# Patient Record
Sex: FEMALE | Race: WHITE | NOT HISPANIC OR LATINO | Employment: UNEMPLOYED | URBAN - METROPOLITAN AREA
[De-identification: names, ages, dates, MRNs, and addresses within clinical notes are randomized per-mention and may not be internally consistent; named-entity substitution may affect disease eponyms.]

---

## 2017-04-26 ENCOUNTER — TRANSCRIBE ORDERS (OUTPATIENT)
Dept: CARDIOLOGY | Facility: CLINIC | Age: 57
End: 2017-04-26

## 2017-04-26 ENCOUNTER — LAB (OUTPATIENT)
Dept: LAB | Facility: HOSPITAL | Age: 57
End: 2017-04-26
Attending: INTERNAL MEDICINE

## 2017-04-26 ENCOUNTER — OFFICE VISIT (OUTPATIENT)
Dept: CARDIOLOGY | Facility: CLINIC | Age: 57
End: 2017-04-26

## 2017-04-26 VITALS
DIASTOLIC BLOOD PRESSURE: 68 MMHG | WEIGHT: 110 LBS | HEART RATE: 84 BPM | SYSTOLIC BLOOD PRESSURE: 132 MMHG | BODY MASS INDEX: 17.27 KG/M2 | HEIGHT: 67 IN

## 2017-04-26 DIAGNOSIS — I73.9 PAD (PERIPHERAL ARTERY DISEASE) (HCC): Primary | ICD-10-CM

## 2017-04-26 DIAGNOSIS — I73.9 PERIPHERAL VASCULAR DISEASE, UNSPECIFIED (HCC): ICD-10-CM

## 2017-04-26 DIAGNOSIS — Z01.810 PRE-OPERATIVE CARDIOVASCULAR EXAMINATION: Primary | ICD-10-CM

## 2017-04-26 DIAGNOSIS — I73.9 CLAUDICATION (HCC): ICD-10-CM

## 2017-04-26 DIAGNOSIS — Z13.6 SCREENING FOR ISCHEMIC HEART DISEASE: ICD-10-CM

## 2017-04-26 DIAGNOSIS — Z01.810 PRE-OPERATIVE CARDIOVASCULAR EXAMINATION: ICD-10-CM

## 2017-04-26 LAB
ANION GAP SERPL CALCULATED.3IONS-SCNC: 17.7 MMOL/L
BASOPHILS # BLD AUTO: 0.03 10*3/MM3 (ref 0–0.2)
BASOPHILS NFR BLD AUTO: 0.3 % (ref 0–1.5)
BUN BLD-MCNC: 13 MG/DL (ref 6–20)
BUN/CREAT SERPL: 14.4 (ref 7–25)
CALCIUM SPEC-SCNC: 10.4 MG/DL (ref 8.6–10.5)
CHLORIDE SERPL-SCNC: 100 MMOL/L (ref 98–107)
CO2 SERPL-SCNC: 23.3 MMOL/L (ref 22–29)
CREAT BLD-MCNC: 0.9 MG/DL (ref 0.57–1)
DEPRECATED RDW RBC AUTO: 46.9 FL (ref 37–54)
EOSINOPHIL # BLD AUTO: 0.09 10*3/MM3 (ref 0–0.7)
EOSINOPHIL NFR BLD AUTO: 0.9 % (ref 0.3–6.2)
ERYTHROCYTE [DISTWIDTH] IN BLOOD BY AUTOMATED COUNT: 14.3 % (ref 11.7–13)
GFR SERPL CREATININE-BSD FRML MDRD: 65 ML/MIN/1.73
GLUCOSE BLD-MCNC: 98 MG/DL (ref 65–99)
HCT VFR BLD AUTO: 45.7 % (ref 35.6–45.5)
HGB BLD-MCNC: 15.2 G/DL (ref 11.9–15.5)
IMM GRANULOCYTES # BLD: 0.02 10*3/MM3 (ref 0–0.03)
IMM GRANULOCYTES NFR BLD: 0.2 % (ref 0–0.5)
LYMPHOCYTES # BLD AUTO: 2.27 10*3/MM3 (ref 0.9–4.8)
LYMPHOCYTES NFR BLD AUTO: 23.1 % (ref 19.6–45.3)
MCH RBC QN AUTO: 30 PG (ref 26.9–32)
MCHC RBC AUTO-ENTMCNC: 33.3 G/DL (ref 32.4–36.3)
MCV RBC AUTO: 90.3 FL (ref 80.5–98.2)
MONOCYTES # BLD AUTO: 0.56 10*3/MM3 (ref 0.2–1.2)
MONOCYTES NFR BLD AUTO: 5.7 % (ref 5–12)
NEUTROPHILS # BLD AUTO: 6.86 10*3/MM3 (ref 1.9–8.1)
NEUTROPHILS NFR BLD AUTO: 69.8 % (ref 42.7–76)
PLATELET # BLD AUTO: 465 10*3/MM3 (ref 140–500)
PMV BLD AUTO: 9.7 FL (ref 6–12)
POTASSIUM BLD-SCNC: 5.1 MMOL/L (ref 3.5–5.2)
RBC # BLD AUTO: 5.06 10*6/MM3 (ref 3.9–5.2)
SODIUM BLD-SCNC: 141 MMOL/L (ref 136–145)
WBC NRBC COR # BLD: 9.83 10*3/MM3 (ref 4.5–10.7)

## 2017-04-26 PROCEDURE — 36415 COLL VENOUS BLD VENIPUNCTURE: CPT

## 2017-04-26 PROCEDURE — 85025 COMPLETE CBC W/AUTO DIFF WBC: CPT

## 2017-04-26 PROCEDURE — 99213 OFFICE O/P EST LOW 20 MIN: CPT | Performed by: INTERNAL MEDICINE

## 2017-04-26 PROCEDURE — 80048 BASIC METABOLIC PNL TOTAL CA: CPT

## 2017-04-27 NOTE — PROGRESS NOTES
Subjective:     Encounter Date:04/26/2017      Patient ID: Susy Ferreira is a 57 y.o. female.    Chief Complaint: PAD, claudication    History of Present Illness     Dear Dr. Willoughby,    I had the pleasure of seeing this patient in cardiac follow up today.  As you well know, she is a jessie 57-year-old woman with history of smoking and peripheral arterial disease.  She has a past history of cholesterol embolization.      She has had stenting of her bilateral iliac arteries in the past due to peripheral artery disease.  Most recently, she had an intervention about 5 months ago due to restenosis and occlusion of her left iliac artery.  Balloon angioplasty alone was performed with a good result.    The patient has developed recurrent symptoms in the left leg.  She has pain and has trouble walking.  She went to [facility], because she was in the area at the time and she had a CT scan that demonstrated occlusion at the left iliac stent.  She was seen by Vascular Surgery and was offered aortofemoral bypass, but she declined.  She is now here to seek alternative strategies.        Review of Systems   All other systems reviewed and are negative.      Procedures       Objective:     Physical Exam   Constitutional: She is oriented to person, place, and time. She appears well-developed and well-nourished.   HENT:   Head: Normocephalic and atraumatic.   Neck: Normal range of motion. Neck supple.   Cardiovascular: Normal rate, regular rhythm and normal heart sounds.    Decreased pulse in L femoral   Pulmonary/Chest: Effort normal and breath sounds normal.   Abdominal: Soft. Bowel sounds are normal.   Musculoskeletal: Normal range of motion.   Neurological: She is alert and oriented to person, place, and time.   Skin: Skin is warm and dry.   Psychiatric: She has a normal mood and affect. Her behavior is normal. Thought content normal.   Vitals reviewed.      Lab Review:       Assessment:          Diagnosis Plan    1. PAD (peripheral artery disease)  Case Request Cath Lab: Abdominal Aortagram with Runoff - PTA of occluded L iliac stent   2. Claudication            Plan:        It was a pleasure to see this patient in cardiac follow up today.  She has recurrent occlusion of her left iliac artery stent.  I suspect that this is due to thrombosis and restenosis.  I think she is unlikely to have a good long term result with balloon angioplasty alone.  I will consider restenting this area, as she is not interested in vascular surgery at this time.  Given the appearance on the CT angiogram, I think the common femoral is still preserved and can serve as a future surgical site if necessary.

## 2017-05-02 ENCOUNTER — HOSPITAL ENCOUNTER (INPATIENT)
Facility: HOSPITAL | Age: 57
LOS: 3 days | Discharge: HOME HEALTH | End: 2017-05-05
Attending: INTERNAL MEDICINE | Admitting: INTERNAL MEDICINE

## 2017-05-02 DIAGNOSIS — I73.9 PAD (PERIPHERAL ARTERY DISEASE) (HCC): ICD-10-CM

## 2017-05-02 LAB
ABO GROUP BLD: NORMAL
BLD GP AB SCN SERPL QL: NEGATIVE
GLUCOSE BLDC GLUCOMTR-MCNC: 187 MG/DL (ref 70–130)
HCT VFR BLD AUTO: 27 % (ref 35.6–45.5)
HCT VFR BLD AUTO: 28.5 % (ref 35.6–45.5)
HGB BLD-MCNC: 8.7 G/DL (ref 11.9–15.5)
HGB BLD-MCNC: 9.1 G/DL (ref 11.9–15.5)
RH BLD: POSITIVE

## 2017-05-02 PROCEDURE — 047J44Z DILATION OF LEFT EXTERNAL ILIAC ARTERY WITH DRUG-ELUTING INTRALUMINAL DEVICE, PERCUTANEOUS ENDOSCOPIC APPROACH: ICD-10-PCS | Performed by: INTERNAL MEDICINE

## 2017-05-02 PROCEDURE — C1894 INTRO/SHEATH, NON-LASER: HCPCS | Performed by: INTERNAL MEDICINE

## 2017-05-02 PROCEDURE — B41D1ZZ FLUOROSCOPY OF AORTA AND BILATERAL LOWER EXTREMITY ARTERIES USING LOW OSMOLAR CONTRAST: ICD-10-PCS | Performed by: INTERNAL MEDICINE

## 2017-05-02 PROCEDURE — 25010000002 FENTANYL CITRATE (PF) 100 MCG/2ML SOLUTION: Performed by: INTERNAL MEDICINE

## 2017-05-02 PROCEDURE — C1725 CATH, TRANSLUMIN NON-LASER: HCPCS | Performed by: INTERNAL MEDICINE

## 2017-05-02 PROCEDURE — 85347 COAGULATION TIME ACTIVATED: CPT

## 2017-05-02 PROCEDURE — 82962 GLUCOSE BLOOD TEST: CPT

## 2017-05-02 PROCEDURE — 25010000002 MIDAZOLAM PER 1 MG: Performed by: INTERNAL MEDICINE

## 2017-05-02 PROCEDURE — 99153 MOD SED SAME PHYS/QHP EA: CPT | Performed by: INTERNAL MEDICINE

## 2017-05-02 PROCEDURE — C1769 GUIDE WIRE: HCPCS | Performed by: INTERNAL MEDICINE

## 2017-05-02 PROCEDURE — 37221 PR REVSC OPN/PRQ ILIAC ART W/STNT PLMT & ANGIOPLSTY: CPT | Performed by: INTERNAL MEDICINE

## 2017-05-02 PROCEDURE — 86901 BLOOD TYPING SEROLOGIC RH(D): CPT | Performed by: INTERNAL MEDICINE

## 2017-05-02 PROCEDURE — 86850 RBC ANTIBODY SCREEN: CPT | Performed by: INTERNAL MEDICINE

## 2017-05-02 PROCEDURE — 85014 HEMATOCRIT: CPT | Performed by: INTERNAL MEDICINE

## 2017-05-02 PROCEDURE — 86923 COMPATIBILITY TEST ELECTRIC: CPT

## 2017-05-02 PROCEDURE — 047D44Z DILATION OF LEFT COMMON ILIAC ARTERY WITH DRUG-ELUTING INTRALUMINAL DEVICE, PERCUTANEOUS ENDOSCOPIC APPROACH: ICD-10-PCS | Performed by: INTERNAL MEDICINE

## 2017-05-02 PROCEDURE — 0 IOPAMIDOL PER 1 ML: Performed by: INTERNAL MEDICINE

## 2017-05-02 PROCEDURE — 25010000002 LORAZEPAM PER 2 MG: Performed by: INTERNAL MEDICINE

## 2017-05-02 PROCEDURE — 99152 MOD SED SAME PHYS/QHP 5/>YRS: CPT | Performed by: INTERNAL MEDICINE

## 2017-05-02 PROCEDURE — 25010000002 ONDANSETRON PER 1 MG: Performed by: INTERNAL MEDICINE

## 2017-05-02 PROCEDURE — C1887 CATHETER, GUIDING: HCPCS | Performed by: INTERNAL MEDICINE

## 2017-05-02 PROCEDURE — C1874 STENT, COATED/COV W/DEL SYS: HCPCS | Performed by: INTERNAL MEDICINE

## 2017-05-02 PROCEDURE — 85018 HEMOGLOBIN: CPT | Performed by: INTERNAL MEDICINE

## 2017-05-02 PROCEDURE — 25010000002 HEPARIN (PORCINE) PER 1000 UNITS: Performed by: INTERNAL MEDICINE

## 2017-05-02 PROCEDURE — 86900 BLOOD TYPING SEROLOGIC ABO: CPT | Performed by: INTERNAL MEDICINE

## 2017-05-02 DEVICE — IMPLANTABLE DEVICE: Type: IMPLANTABLE DEVICE | Status: FUNCTIONAL

## 2017-05-02 RX ORDER — METOCLOPRAMIDE HYDROCHLORIDE 5 MG/ML
10 INJECTION INTRAMUSCULAR; INTRAVENOUS EVERY 6 HOURS PRN
Status: DISCONTINUED | OUTPATIENT
Start: 2017-05-02 | End: 2017-05-04

## 2017-05-02 RX ORDER — OXYCODONE HYDROCHLORIDE AND ACETAMINOPHEN 5; 325 MG/1; MG/1
1 TABLET ORAL EVERY 6 HOURS PRN
Qty: 20 TABLET | Refills: 0 | Status: SHIPPED | OUTPATIENT
Start: 2017-05-02 | End: 2017-05-02

## 2017-05-02 RX ORDER — SODIUM CHLORIDE 9 MG/ML
75 INJECTION, SOLUTION INTRAVENOUS CONTINUOUS
Status: DISCONTINUED | OUTPATIENT
Start: 2017-05-02 | End: 2017-05-02

## 2017-05-02 RX ORDER — FENTANYL CITRATE 50 UG/ML
50 INJECTION, SOLUTION INTRAMUSCULAR; INTRAVENOUS
Status: DISCONTINUED | OUTPATIENT
Start: 2017-05-02 | End: 2017-05-04

## 2017-05-02 RX ORDER — SODIUM CHLORIDE 9 MG/ML
100 INJECTION, SOLUTION INTRAVENOUS CONTINUOUS
Status: DISCONTINUED | OUTPATIENT
Start: 2017-05-02 | End: 2017-05-04

## 2017-05-02 RX ORDER — HYDROCODONE BITARTRATE AND ACETAMINOPHEN 5; 325 MG/1; MG/1
1 TABLET ORAL EVERY 4 HOURS PRN
Status: DISCONTINUED | OUTPATIENT
Start: 2017-05-02 | End: 2017-05-04

## 2017-05-02 RX ORDER — OXYCODONE HYDROCHLORIDE AND ACETAMINOPHEN 5; 325 MG/1; MG/1
1 TABLET ORAL ONCE AS NEEDED
Status: COMPLETED | OUTPATIENT
Start: 2017-05-02 | End: 2017-05-02

## 2017-05-02 RX ORDER — MIDAZOLAM HYDROCHLORIDE 1 MG/ML
1 INJECTION INTRAMUSCULAR; INTRAVENOUS
Status: DISCONTINUED | OUTPATIENT
Start: 2017-05-02 | End: 2017-05-04

## 2017-05-02 RX ORDER — FENTANYL CITRATE 50 UG/ML
50 INJECTION, SOLUTION INTRAMUSCULAR; INTRAVENOUS ONCE AS NEEDED
Status: COMPLETED | OUTPATIENT
Start: 2017-05-02 | End: 2017-05-02

## 2017-05-02 RX ORDER — MIDAZOLAM HYDROCHLORIDE 1 MG/ML
INJECTION INTRAMUSCULAR; INTRAVENOUS AS NEEDED
Status: DISCONTINUED | OUTPATIENT
Start: 2017-05-02 | End: 2017-05-02 | Stop reason: HOSPADM

## 2017-05-02 RX ORDER — CLOPIDOGREL BISULFATE 75 MG/1
TABLET ORAL AS NEEDED
Status: DISCONTINUED | OUTPATIENT
Start: 2017-05-02 | End: 2017-05-02 | Stop reason: HOSPADM

## 2017-05-02 RX ORDER — ONDANSETRON 2 MG/ML
4 INJECTION INTRAMUSCULAR; INTRAVENOUS EVERY 6 HOURS PRN
Status: DISCONTINUED | OUTPATIENT
Start: 2017-05-02 | End: 2017-05-04

## 2017-05-02 RX ORDER — OXYCODONE HYDROCHLORIDE AND ACETAMINOPHEN 5; 325 MG/1; MG/1
1 TABLET ORAL EVERY 6 HOURS PRN
Qty: 20 TABLET | Refills: 0 | Status: SHIPPED | OUTPATIENT
Start: 2017-05-02 | End: 2017-05-12

## 2017-05-02 RX ORDER — FENTANYL CITRATE 50 UG/ML
INJECTION, SOLUTION INTRAMUSCULAR; INTRAVENOUS AS NEEDED
Status: DISCONTINUED | OUTPATIENT
Start: 2017-05-02 | End: 2017-05-02 | Stop reason: HOSPADM

## 2017-05-02 RX ORDER — SODIUM CHLORIDE 0.9 % (FLUSH) 0.9 %
1-10 SYRINGE (ML) INJECTION AS NEEDED
Status: DISCONTINUED | OUTPATIENT
Start: 2017-05-02 | End: 2017-05-04

## 2017-05-02 RX ORDER — LIDOCAINE HYDROCHLORIDE 20 MG/ML
INJECTION, SOLUTION INFILTRATION; PERINEURAL AS NEEDED
Status: DISCONTINUED | OUTPATIENT
Start: 2017-05-02 | End: 2017-05-02 | Stop reason: HOSPADM

## 2017-05-02 RX ORDER — PROMETHAZINE HYDROCHLORIDE 25 MG/ML
12.5 INJECTION, SOLUTION INTRAMUSCULAR; INTRAVENOUS EVERY 4 HOURS PRN
Status: DISCONTINUED | OUTPATIENT
Start: 2017-05-02 | End: 2017-05-04

## 2017-05-02 RX ORDER — LIDOCAINE HYDROCHLORIDE 10 MG/ML
0.1 INJECTION, SOLUTION EPIDURAL; INFILTRATION; INTRACAUDAL; PERINEURAL ONCE AS NEEDED
Status: DISCONTINUED | OUTPATIENT
Start: 2017-05-02 | End: 2017-05-04

## 2017-05-02 RX ORDER — ACETAMINOPHEN 325 MG/1
650 TABLET ORAL EVERY 4 HOURS PRN
Status: DISCONTINUED | OUTPATIENT
Start: 2017-05-02 | End: 2017-05-04

## 2017-05-02 RX ORDER — OXYCODONE HYDROCHLORIDE AND ACETAMINOPHEN 5; 325 MG/1; MG/1
1 TABLET ORAL EVERY 6 HOURS PRN
Status: DISCONTINUED | OUTPATIENT
Start: 2017-05-02 | End: 2017-05-02 | Stop reason: HOSPADM

## 2017-05-02 RX ORDER — HEPARIN SODIUM 1000 [USP'U]/ML
INJECTION, SOLUTION INTRAVENOUS; SUBCUTANEOUS AS NEEDED
Status: DISCONTINUED | OUTPATIENT
Start: 2017-05-02 | End: 2017-05-02 | Stop reason: HOSPADM

## 2017-05-02 RX ORDER — NITROGLYCERIN 0.4 MG/1
0.4 TABLET SUBLINGUAL
Status: DISCONTINUED | OUTPATIENT
Start: 2017-05-02 | End: 2017-05-04

## 2017-05-02 RX ORDER — CLOPIDOGREL BISULFATE 75 MG/1
75 TABLET ORAL DAILY
Qty: 30 TABLET | Refills: 11 | Status: SHIPPED | OUTPATIENT
Start: 2017-05-02 | End: 2017-05-02 | Stop reason: HOSPADM

## 2017-05-02 RX ORDER — LORAZEPAM 2 MG/ML
1 INJECTION INTRAMUSCULAR ONCE
Status: COMPLETED | OUTPATIENT
Start: 2017-05-02 | End: 2017-05-02

## 2017-05-02 RX ORDER — ASPIRIN 325 MG
TABLET ORAL AS NEEDED
Status: DISCONTINUED | OUTPATIENT
Start: 2017-05-02 | End: 2017-05-02 | Stop reason: HOSPADM

## 2017-05-02 RX ADMIN — LORAZEPAM 1 MG: 2 INJECTION INTRAMUSCULAR; INTRAVENOUS at 15:46

## 2017-05-02 RX ADMIN — FENTANYL CITRATE 50 MCG: 50 INJECTION INTRAMUSCULAR; INTRAVENOUS at 12:25

## 2017-05-02 RX ADMIN — SODIUM CHLORIDE 1000 ML: 9 INJECTION, SOLUTION INTRAVENOUS at 16:25

## 2017-05-02 RX ADMIN — SODIUM CHLORIDE 75 ML/HR: 9 INJECTION, SOLUTION INTRAVENOUS at 09:55

## 2017-05-02 RX ADMIN — FENTANYL CITRATE 50 MCG: 50 INJECTION INTRAMUSCULAR; INTRAVENOUS at 21:05

## 2017-05-02 RX ADMIN — OXYCODONE HYDROCHLORIDE AND ACETAMINOPHEN 1 TABLET: 5; 325 TABLET ORAL at 14:35

## 2017-05-02 RX ADMIN — FENTANYL CITRATE 50 MCG: 50 INJECTION INTRAMUSCULAR; INTRAVENOUS at 13:49

## 2017-05-02 RX ADMIN — FENTANYL CITRATE 50 MCG: 50 INJECTION INTRAMUSCULAR; INTRAVENOUS at 10:16

## 2017-05-02 RX ADMIN — FENTANYL CITRATE 50 MCG: 50 INJECTION INTRAMUSCULAR; INTRAVENOUS at 14:24

## 2017-05-02 RX ADMIN — ONDANSETRON 4 MG: 2 INJECTION INTRAMUSCULAR; INTRAVENOUS at 16:28

## 2017-05-02 RX ADMIN — SODIUM CHLORIDE 100 ML/HR: 9 INJECTION, SOLUTION INTRAVENOUS at 14:25

## 2017-05-02 RX ADMIN — SODIUM CHLORIDE 500 ML: 9 INJECTION, SOLUTION INTRAVENOUS at 17:23

## 2017-05-02 RX ADMIN — OXYCODONE HYDROCHLORIDE AND ACETAMINOPHEN 1 TABLET: 5; 325 TABLET ORAL at 12:41

## 2017-05-03 LAB
ACT BLD: 209 SECONDS (ref 82–152)
ACT BLD: 229 SECONDS (ref 82–152)
ACT BLD: 276 SECONDS (ref 82–152)
ANION GAP SERPL CALCULATED.3IONS-SCNC: 14.5 MMOL/L
BUN BLD-MCNC: 15 MG/DL (ref 6–20)
BUN/CREAT SERPL: 18.1 (ref 7–25)
CALCIUM SPEC-SCNC: 8.1 MG/DL (ref 8.6–10.5)
CHLORIDE SERPL-SCNC: 105 MMOL/L (ref 98–107)
CO2 SERPL-SCNC: 17.5 MMOL/L (ref 22–29)
CREAT BLD-MCNC: 0.83 MG/DL (ref 0.57–1)
DEPRECATED RDW RBC AUTO: 44.5 FL (ref 37–54)
ERYTHROCYTE [DISTWIDTH] IN BLOOD BY AUTOMATED COUNT: 14.2 % (ref 11.7–13)
GFR SERPL CREATININE-BSD FRML MDRD: 71 ML/MIN/1.73
GLUCOSE BLD-MCNC: 101 MG/DL (ref 65–99)
HCT VFR BLD AUTO: 17 % (ref 35.6–45.5)
HCT VFR BLD AUTO: 17.5 % (ref 35.6–45.5)
HCT VFR BLD AUTO: 24 % (ref 35.6–45.5)
HCT VFR BLD AUTO: 24.4 % (ref 35.6–45.5)
HGB BLD-MCNC: 5.7 G/DL (ref 11.9–15.5)
HGB BLD-MCNC: 5.8 G/DL (ref 11.9–15.5)
HGB BLD-MCNC: 7.7 G/DL (ref 11.9–15.5)
HGB BLD-MCNC: 8.4 G/DL (ref 11.9–15.5)
INR PPP: 1.32 (ref 0.9–1.1)
MCH RBC QN AUTO: 28.9 PG (ref 26.9–32)
MCHC RBC AUTO-ENTMCNC: 33.5 G/DL (ref 32.4–36.3)
MCV RBC AUTO: 86.3 FL (ref 80.5–98.2)
PLATELET # BLD AUTO: 257 10*3/MM3 (ref 140–500)
PMV BLD AUTO: 9.7 FL (ref 6–12)
POTASSIUM BLD-SCNC: 4 MMOL/L (ref 3.5–5.2)
PROTHROMBIN TIME: 15.9 SECONDS (ref 11.7–14.2)
RBC # BLD AUTO: 1.97 10*6/MM3 (ref 3.9–5.2)
SODIUM BLD-SCNC: 137 MMOL/L (ref 136–145)
WBC NRBC COR # BLD: 17.21 10*3/MM3 (ref 4.5–10.7)

## 2017-05-03 PROCEDURE — 85014 HEMATOCRIT: CPT | Performed by: INTERNAL MEDICINE

## 2017-05-03 PROCEDURE — 86900 BLOOD TYPING SEROLOGIC ABO: CPT

## 2017-05-03 PROCEDURE — 99232 SBSQ HOSP IP/OBS MODERATE 35: CPT | Performed by: INTERNAL MEDICINE

## 2017-05-03 PROCEDURE — 85018 HEMOGLOBIN: CPT | Performed by: INTERNAL MEDICINE

## 2017-05-03 PROCEDURE — 85610 PROTHROMBIN TIME: CPT | Performed by: INTERNAL MEDICINE

## 2017-05-03 PROCEDURE — 85027 COMPLETE CBC AUTOMATED: CPT | Performed by: INTERNAL MEDICINE

## 2017-05-03 PROCEDURE — 86901 BLOOD TYPING SEROLOGIC RH(D): CPT

## 2017-05-03 PROCEDURE — 36430 TRANSFUSION BLD/BLD COMPNT: CPT

## 2017-05-03 PROCEDURE — 25010000002 DIPHENHYDRAMINE PER 50 MG: Performed by: INTERNAL MEDICINE

## 2017-05-03 PROCEDURE — 80048 BASIC METABOLIC PNL TOTAL CA: CPT | Performed by: INTERNAL MEDICINE

## 2017-05-03 PROCEDURE — 25010000002 FENTANYL CITRATE (PF) 100 MCG/2ML SOLUTION: Performed by: INTERNAL MEDICINE

## 2017-05-03 PROCEDURE — P9016 RBC LEUKOCYTES REDUCED: HCPCS

## 2017-05-03 RX ORDER — DIPHENHYDRAMINE HYDROCHLORIDE 50 MG/ML
25 INJECTION INTRAMUSCULAR; INTRAVENOUS ONCE
Status: COMPLETED | OUTPATIENT
Start: 2017-05-03 | End: 2017-05-03

## 2017-05-03 RX ORDER — ACETAMINOPHEN 325 MG/1
650 TABLET ORAL ONCE
Status: COMPLETED | OUTPATIENT
Start: 2017-05-03 | End: 2017-05-03

## 2017-05-03 RX ORDER — NICOTINE 21 MG/24HR
1 PATCH, TRANSDERMAL 24 HOURS TRANSDERMAL EVERY 24 HOURS
Status: DISCONTINUED | OUTPATIENT
Start: 2017-05-03 | End: 2017-05-05 | Stop reason: HOSPADM

## 2017-05-03 RX ADMIN — FENTANYL CITRATE 50 MCG: 50 INJECTION INTRAMUSCULAR; INTRAVENOUS at 09:16

## 2017-05-03 RX ADMIN — ACETAMINOPHEN 650 MG: 325 TABLET ORAL at 11:23

## 2017-05-03 RX ADMIN — FENTANYL CITRATE 50 MCG: 50 INJECTION INTRAMUSCULAR; INTRAVENOUS at 16:40

## 2017-05-03 RX ADMIN — SODIUM CHLORIDE 100 ML/HR: 9 INJECTION, SOLUTION INTRAVENOUS at 00:29

## 2017-05-03 RX ADMIN — FENTANYL CITRATE 50 MCG: 50 INJECTION INTRAMUSCULAR; INTRAVENOUS at 03:41

## 2017-05-03 RX ADMIN — FENTANYL CITRATE 50 MCG: 50 INJECTION INTRAMUSCULAR; INTRAVENOUS at 21:42

## 2017-05-03 RX ADMIN — DIPHENHYDRAMINE HYDROCHLORIDE 25 MG: 50 INJECTION, SOLUTION INTRAMUSCULAR; INTRAVENOUS at 11:22

## 2017-05-03 RX ADMIN — NICOTINE 1 PATCH: 21 PATCH, EXTENDED RELEASE TRANSDERMAL at 17:42

## 2017-05-03 RX ADMIN — SODIUM CHLORIDE 100 ML/HR: 9 INJECTION, SOLUTION INTRAVENOUS at 11:52

## 2017-05-03 RX ADMIN — FENTANYL CITRATE 50 MCG: 50 INJECTION INTRAMUSCULAR; INTRAVENOUS at 14:21

## 2017-05-03 RX ADMIN — FENTANYL CITRATE 50 MCG: 50 INJECTION INTRAMUSCULAR; INTRAVENOUS at 06:09

## 2017-05-03 RX ADMIN — FENTANYL CITRATE 50 MCG: 50 INJECTION INTRAMUSCULAR; INTRAVENOUS at 19:29

## 2017-05-03 RX ADMIN — FENTANYL CITRATE 50 MCG: 50 INJECTION INTRAMUSCULAR; INTRAVENOUS at 11:15

## 2017-05-03 RX ADMIN — SODIUM CHLORIDE 100 ML/HR: 9 INJECTION, SOLUTION INTRAVENOUS at 23:10

## 2017-05-04 LAB
ABO + RH BLD: NORMAL
ABO + RH BLD: NORMAL
ALBUMIN SERPL-MCNC: 3.2 G/DL (ref 3.5–5.2)
ALBUMIN/GLOB SERPL: 1.5 G/DL
ALP SERPL-CCNC: 55 U/L (ref 39–117)
ALT SERPL W P-5'-P-CCNC: <5 U/L (ref 1–33)
ANION GAP SERPL CALCULATED.3IONS-SCNC: 10 MMOL/L
AST SERPL-CCNC: 9 U/L (ref 1–32)
BH BB BLOOD EXPIRATION DATE: NORMAL
BH BB BLOOD EXPIRATION DATE: NORMAL
BH BB BLOOD TYPE BARCODE: 5100
BH BB BLOOD TYPE BARCODE: 5100
BH BB DISPENSE STATUS: NORMAL
BH BB DISPENSE STATUS: NORMAL
BH BB PRODUCT CODE: NORMAL
BH BB PRODUCT CODE: NORMAL
BH BB UNIT NUMBER: NORMAL
BH BB UNIT NUMBER: NORMAL
BILIRUB SERPL-MCNC: 0.9 MG/DL (ref 0.1–1.2)
BUN BLD-MCNC: 6 MG/DL (ref 6–20)
BUN/CREAT SERPL: 10.3 (ref 7–25)
CALCIUM SPEC-SCNC: 8.3 MG/DL (ref 8.6–10.5)
CHLORIDE SERPL-SCNC: 110 MMOL/L (ref 98–107)
CO2 SERPL-SCNC: 21 MMOL/L (ref 22–29)
CREAT BLD-MCNC: 0.58 MG/DL (ref 0.57–1)
DEPRECATED RDW RBC AUTO: 47.7 FL (ref 37–54)
ERYTHROCYTE [DISTWIDTH] IN BLOOD BY AUTOMATED COUNT: 14.9 % (ref 11.7–13)
GFR SERPL CREATININE-BSD FRML MDRD: 108 ML/MIN/1.73
GLOBULIN UR ELPH-MCNC: 2.2 GM/DL
GLUCOSE BLD-MCNC: 100 MG/DL (ref 65–99)
GLUCOSE BLDC GLUCOMTR-MCNC: 109 MG/DL (ref 70–130)
GLUCOSE BLDC GLUCOMTR-MCNC: 118 MG/DL (ref 70–130)
GLUCOSE BLDC GLUCOMTR-MCNC: 119 MG/DL (ref 70–130)
HCT VFR BLD AUTO: 22.2 % (ref 35.6–45.5)
HCT VFR BLD AUTO: 28.9 % (ref 35.6–45.5)
HGB BLD-MCNC: 10.2 G/DL (ref 11.9–15.5)
HGB BLD-MCNC: 7.5 G/DL (ref 11.9–15.5)
MCH RBC QN AUTO: 29.3 PG (ref 26.9–32)
MCHC RBC AUTO-ENTMCNC: 33.8 G/DL (ref 32.4–36.3)
MCV RBC AUTO: 86.7 FL (ref 80.5–98.2)
PLATELET # BLD AUTO: 178 10*3/MM3 (ref 140–500)
PMV BLD AUTO: 9.3 FL (ref 6–12)
POTASSIUM BLD-SCNC: 4 MMOL/L (ref 3.5–5.2)
PROT SERPL-MCNC: 5.4 G/DL (ref 6–8.5)
RBC # BLD AUTO: 2.56 10*6/MM3 (ref 3.9–5.2)
SODIUM BLD-SCNC: 141 MMOL/L (ref 136–145)
UNIT  ABO: NORMAL
UNIT  ABO: NORMAL
UNIT  RH: NORMAL
UNIT  RH: NORMAL
WBC NRBC COR # BLD: 11.04 10*3/MM3 (ref 4.5–10.7)

## 2017-05-04 PROCEDURE — 85027 COMPLETE CBC AUTOMATED: CPT | Performed by: INTERNAL MEDICINE

## 2017-05-04 PROCEDURE — 99232 SBSQ HOSP IP/OBS MODERATE 35: CPT | Performed by: INTERNAL MEDICINE

## 2017-05-04 PROCEDURE — 36430 TRANSFUSION BLD/BLD COMPNT: CPT

## 2017-05-04 PROCEDURE — 85014 HEMATOCRIT: CPT | Performed by: INTERNAL MEDICINE

## 2017-05-04 PROCEDURE — 85018 HEMOGLOBIN: CPT | Performed by: INTERNAL MEDICINE

## 2017-05-04 PROCEDURE — 80053 COMPREHEN METABOLIC PANEL: CPT | Performed by: INTERNAL MEDICINE

## 2017-05-04 PROCEDURE — 25010000002 FENTANYL CITRATE (PF) 100 MCG/2ML SOLUTION: Performed by: INTERNAL MEDICINE

## 2017-05-04 PROCEDURE — 86900 BLOOD TYPING SEROLOGIC ABO: CPT

## 2017-05-04 PROCEDURE — P9016 RBC LEUKOCYTES REDUCED: HCPCS

## 2017-05-04 PROCEDURE — 82962 GLUCOSE BLOOD TEST: CPT

## 2017-05-04 RX ORDER — HYDROCODONE BITARTRATE AND ACETAMINOPHEN 5; 325 MG/1; MG/1
1 TABLET ORAL EVERY 4 HOURS PRN
Status: DISCONTINUED | OUTPATIENT
Start: 2017-05-04 | End: 2017-05-05 | Stop reason: HOSPADM

## 2017-05-04 RX ADMIN — HYDROCODONE BITARTATE AND ACETAMINOPHEN 1 TABLET: 5; 325 TABLET ORAL at 20:36

## 2017-05-04 RX ADMIN — FENTANYL CITRATE 50 MCG: 50 INJECTION INTRAMUSCULAR; INTRAVENOUS at 02:32

## 2017-05-04 RX ADMIN — FENTANYL CITRATE 50 MCG: 50 INJECTION INTRAMUSCULAR; INTRAVENOUS at 06:24

## 2017-05-04 RX ADMIN — NICOTINE 1 PATCH: 21 PATCH, EXTENDED RELEASE TRANSDERMAL at 16:33

## 2017-05-04 RX ADMIN — HYDROCODONE BITARTATE AND ACETAMINOPHEN 1 TABLET: 5; 325 TABLET ORAL at 16:31

## 2017-05-04 RX ADMIN — HYDROCODONE BITARTRATE AND ACETAMINOPHEN 1 TABLET: 5; 325 TABLET ORAL at 08:21

## 2017-05-04 RX ADMIN — SODIUM CHLORIDE 100 ML/HR: 9 INJECTION, SOLUTION INTRAVENOUS at 08:22

## 2017-05-04 RX ADMIN — HYDROCODONE BITARTATE AND ACETAMINOPHEN 1 TABLET: 5; 325 TABLET ORAL at 12:22

## 2017-05-04 RX ADMIN — FENTANYL CITRATE 50 MCG: 50 INJECTION INTRAMUSCULAR; INTRAVENOUS at 04:15

## 2017-05-05 VITALS
HEIGHT: 67 IN | BODY MASS INDEX: 18.99 KG/M2 | WEIGHT: 121 LBS | DIASTOLIC BLOOD PRESSURE: 97 MMHG | OXYGEN SATURATION: 96 % | RESPIRATION RATE: 18 BRPM | SYSTOLIC BLOOD PRESSURE: 139 MMHG | TEMPERATURE: 98 F | HEART RATE: 66 BPM

## 2017-05-05 LAB
ABO + RH BLD: NORMAL
ANION GAP SERPL CALCULATED.3IONS-SCNC: 13.2 MMOL/L
BH BB BLOOD EXPIRATION DATE: NORMAL
BH BB BLOOD TYPE BARCODE: 5100
BH BB DISPENSE STATUS: NORMAL
BH BB PRODUCT CODE: NORMAL
BH BB UNIT NUMBER: NORMAL
BUN BLD-MCNC: 6 MG/DL (ref 6–20)
BUN/CREAT SERPL: 9.5 (ref 7–25)
CALCIUM SPEC-SCNC: 8.9 MG/DL (ref 8.6–10.5)
CHLORIDE SERPL-SCNC: 105 MMOL/L (ref 98–107)
CO2 SERPL-SCNC: 20.8 MMOL/L (ref 22–29)
CREAT BLD-MCNC: 0.63 MG/DL (ref 0.57–1)
DEPRECATED RDW RBC AUTO: 46.3 FL (ref 37–54)
ERYTHROCYTE [DISTWIDTH] IN BLOOD BY AUTOMATED COUNT: 14.7 % (ref 11.7–13)
GFR SERPL CREATININE-BSD FRML MDRD: 98 ML/MIN/1.73
GLUCOSE BLD-MCNC: 102 MG/DL (ref 65–99)
HCT VFR BLD AUTO: 29.3 % (ref 35.6–45.5)
HGB BLD-MCNC: 10 G/DL (ref 11.9–15.5)
MCH RBC QN AUTO: 29.7 PG (ref 26.9–32)
MCHC RBC AUTO-ENTMCNC: 34.1 G/DL (ref 32.4–36.3)
MCV RBC AUTO: 86.9 FL (ref 80.5–98.2)
PLATELET # BLD AUTO: 184 10*3/MM3 (ref 140–500)
PMV BLD AUTO: 10 FL (ref 6–12)
POTASSIUM BLD-SCNC: 3.9 MMOL/L (ref 3.5–5.2)
RBC # BLD AUTO: 3.37 10*6/MM3 (ref 3.9–5.2)
SODIUM BLD-SCNC: 139 MMOL/L (ref 136–145)
UNIT  ABO: NORMAL
UNIT  RH: NORMAL
WBC NRBC COR # BLD: 10.51 10*3/MM3 (ref 4.5–10.7)

## 2017-05-05 PROCEDURE — 80048 BASIC METABOLIC PNL TOTAL CA: CPT | Performed by: INTERNAL MEDICINE

## 2017-05-05 PROCEDURE — 85027 COMPLETE CBC AUTOMATED: CPT | Performed by: INTERNAL MEDICINE

## 2017-05-05 PROCEDURE — 99238 HOSP IP/OBS DSCHRG MGMT 30/<: CPT | Performed by: INTERNAL MEDICINE

## 2017-05-05 RX ORDER — ASPIRIN 81 MG/1
81 TABLET ORAL DAILY
Qty: 30 TABLET | Refills: 11
Start: 2017-05-05

## 2017-05-05 RX ORDER — HYDROCODONE BITARTRATE AND ACETAMINOPHEN 5; 325 MG/1; MG/1
1 TABLET ORAL EVERY 4 HOURS PRN
Qty: 15 TABLET | Refills: 0 | Status: SHIPPED | OUTPATIENT
Start: 2017-05-05 | End: 2017-05-12

## 2017-05-05 RX ORDER — FENTANYL CITRATE 50 UG/ML
INJECTION, SOLUTION INTRAMUSCULAR; INTRAVENOUS
Status: DISCONTINUED
Start: 2017-05-05 | End: 2017-05-05 | Stop reason: WASHOUT

## 2017-05-05 RX ORDER — CLOPIDOGREL BISULFATE 75 MG/1
75 TABLET ORAL DAILY
Status: DISCONTINUED | OUTPATIENT
Start: 2017-05-06 | End: 2017-05-05 | Stop reason: HOSPADM

## 2017-05-05 RX ORDER — CLOPIDOGREL BISULFATE 75 MG/1
300 TABLET ORAL ONCE
Status: COMPLETED | OUTPATIENT
Start: 2017-05-05 | End: 2017-05-05

## 2017-05-05 RX ORDER — ASPIRIN 81 MG/1
81 TABLET ORAL DAILY
Status: DISCONTINUED | OUTPATIENT
Start: 2017-05-05 | End: 2017-05-05 | Stop reason: HOSPADM

## 2017-05-05 RX ORDER — NICOTINE 21 MG/24HR
1 PATCH, TRANSDERMAL 24 HOURS TRANSDERMAL EVERY 24 HOURS
Qty: 21 PATCH | Refills: 0 | Status: SHIPPED | OUTPATIENT
Start: 2017-05-05 | End: 2017-05-23 | Stop reason: SDUPTHER

## 2017-05-05 RX ORDER — CLOPIDOGREL BISULFATE 75 MG/1
75 TABLET ORAL DAILY
Qty: 30 TABLET | Refills: 3 | Status: SHIPPED | OUTPATIENT
Start: 2017-05-06 | End: 2017-08-23 | Stop reason: SDUPTHER

## 2017-05-05 RX ADMIN — HYDROCODONE BITARTATE AND ACETAMINOPHEN 1 TABLET: 5; 325 TABLET ORAL at 04:34

## 2017-05-05 RX ADMIN — ASPIRIN 81 MG: 81 TABLET ORAL at 10:57

## 2017-05-05 RX ADMIN — HYDROCODONE BITARTATE AND ACETAMINOPHEN 1 TABLET: 5; 325 TABLET ORAL at 00:29

## 2017-05-05 RX ADMIN — CLOPIDOGREL 300 MG: 75 TABLET, FILM COATED ORAL at 10:56

## 2017-05-05 RX ADMIN — HYDROCODONE BITARTATE AND ACETAMINOPHEN 1 TABLET: 5; 325 TABLET ORAL at 09:01

## 2017-05-07 ENCOUNTER — HOSPITAL ENCOUNTER (OUTPATIENT)
Facility: HOSPITAL | Age: 57
Setting detail: OBSERVATION
Discharge: HOME | End: 2017-05-08
Attending: EMERGENCY MEDICINE | Admitting: INTERNAL MEDICINE

## 2017-05-07 DIAGNOSIS — K66.1 RETROPERITONEAL HEMATOMA: Primary | ICD-10-CM

## 2017-05-07 LAB
APTT PPP: 34.4 SECONDS (ref 22.7–35.4)
BASOPHILS # BLD AUTO: 0.02 10*3/MM3 (ref 0–0.2)
BASOPHILS NFR BLD AUTO: 0.1 % (ref 0–1.5)
DEPRECATED RDW RBC AUTO: 45.4 FL (ref 37–54)
EOSINOPHIL # BLD AUTO: 0.03 10*3/MM3 (ref 0–0.7)
EOSINOPHIL NFR BLD AUTO: 0.2 % (ref 0.3–6.2)
ERYTHROCYTE [DISTWIDTH] IN BLOOD BY AUTOMATED COUNT: 14.7 % (ref 11.7–13)
HCT VFR BLD AUTO: 32.1 % (ref 35.6–45.5)
HGB BLD-MCNC: 11.1 G/DL (ref 11.9–15.5)
IMM GRANULOCYTES # BLD: 0.05 10*3/MM3 (ref 0–0.03)
IMM GRANULOCYTES NFR BLD: 0.3 % (ref 0–0.5)
INR PPP: 1.08 (ref 0.9–1.1)
LYMPHOCYTES # BLD AUTO: 1.79 10*3/MM3 (ref 0.9–4.8)
LYMPHOCYTES NFR BLD AUTO: 10.6 % (ref 19.6–45.3)
MCH RBC QN AUTO: 30.1 PG (ref 26.9–32)
MCHC RBC AUTO-ENTMCNC: 34.6 G/DL (ref 32.4–36.3)
MCV RBC AUTO: 87 FL (ref 80.5–98.2)
MONOCYTES # BLD AUTO: 1.54 10*3/MM3 (ref 0.2–1.2)
MONOCYTES NFR BLD AUTO: 9.1 % (ref 5–12)
NEUTROPHILS # BLD AUTO: 13.53 10*3/MM3 (ref 1.9–8.1)
NEUTROPHILS NFR BLD AUTO: 79.7 % (ref 42.7–76)
PLATELET # BLD AUTO: 284 10*3/MM3 (ref 140–500)
PMV BLD AUTO: 9.9 FL (ref 6–12)
PROTHROMBIN TIME: 13.6 SECONDS (ref 11.7–14.2)
RBC # BLD AUTO: 3.69 10*6/MM3 (ref 3.9–5.2)
WBC NRBC COR # BLD: 16.96 10*3/MM3 (ref 4.5–10.7)

## 2017-05-07 PROCEDURE — 25010000002 ONDANSETRON PER 1 MG: Performed by: EMERGENCY MEDICINE

## 2017-05-07 PROCEDURE — 85025 COMPLETE CBC W/AUTO DIFF WBC: CPT | Performed by: EMERGENCY MEDICINE

## 2017-05-07 PROCEDURE — 99284 EMERGENCY DEPT VISIT MOD MDM: CPT

## 2017-05-07 PROCEDURE — 96374 THER/PROPH/DIAG INJ IV PUSH: CPT

## 2017-05-07 PROCEDURE — 85610 PROTHROMBIN TIME: CPT | Performed by: EMERGENCY MEDICINE

## 2017-05-07 PROCEDURE — 25010000002 HYDROMORPHONE PER 4 MG: Performed by: EMERGENCY MEDICINE

## 2017-05-07 PROCEDURE — G0378 HOSPITAL OBSERVATION PER HR: HCPCS

## 2017-05-07 PROCEDURE — 96375 TX/PRO/DX INJ NEW DRUG ADDON: CPT

## 2017-05-07 PROCEDURE — 80053 COMPREHEN METABOLIC PANEL: CPT | Performed by: EMERGENCY MEDICINE

## 2017-05-07 PROCEDURE — 85730 THROMBOPLASTIN TIME PARTIAL: CPT | Performed by: EMERGENCY MEDICINE

## 2017-05-07 RX ORDER — ONDANSETRON 2 MG/ML
4 INJECTION INTRAMUSCULAR; INTRAVENOUS ONCE
Status: COMPLETED | OUTPATIENT
Start: 2017-05-07 | End: 2017-05-07

## 2017-05-07 RX ADMIN — HYDROMORPHONE HYDROCHLORIDE 1 MG: 1 INJECTION, SOLUTION INTRAMUSCULAR; INTRAVENOUS; SUBCUTANEOUS at 23:21

## 2017-05-07 RX ADMIN — ONDANSETRON 4 MG: 2 INJECTION INTRAMUSCULAR; INTRAVENOUS at 23:21

## 2017-05-08 ENCOUNTER — APPOINTMENT (OUTPATIENT)
Dept: CT IMAGING | Facility: HOSPITAL | Age: 57
End: 2017-05-08

## 2017-05-08 VITALS
TEMPERATURE: 98.2 F | BODY MASS INDEX: 17.27 KG/M2 | OXYGEN SATURATION: 98 % | DIASTOLIC BLOOD PRESSURE: 67 MMHG | SYSTOLIC BLOOD PRESSURE: 88 MMHG | HEART RATE: 97 BPM | HEIGHT: 67 IN | WEIGHT: 110.01 LBS | RESPIRATION RATE: 16 BRPM

## 2017-05-08 PROBLEM — K66.1 RETROPERITONEAL HEMATOMA: Status: ACTIVE | Noted: 2017-05-08

## 2017-05-08 LAB
ALBUMIN SERPL-MCNC: 4 G/DL (ref 3.5–5.2)
ALBUMIN/GLOB SERPL: 1.1 G/DL
ALP SERPL-CCNC: 73 U/L (ref 39–117)
ALT SERPL W P-5'-P-CCNC: 6 U/L (ref 1–33)
ANION GAP SERPL CALCULATED.3IONS-SCNC: 17.7 MMOL/L
ANION GAP SERPL CALCULATED.3IONS-SCNC: 18.8 MMOL/L
AST SERPL-CCNC: 10 U/L (ref 1–32)
BASOPHILS # BLD AUTO: 0.03 10*3/MM3 (ref 0–0.2)
BASOPHILS NFR BLD AUTO: 0.2 % (ref 0–1.5)
BILIRUB SERPL-MCNC: 2.1 MG/DL (ref 0.1–1.2)
BUN BLD-MCNC: 7 MG/DL (ref 6–20)
BUN BLD-MCNC: 7 MG/DL (ref 6–20)
BUN/CREAT SERPL: 10.6 (ref 7–25)
BUN/CREAT SERPL: 13.2 (ref 7–25)
CALCIUM SPEC-SCNC: 9.6 MG/DL (ref 8.6–10.5)
CALCIUM SPEC-SCNC: 9.9 MG/DL (ref 8.6–10.5)
CHLORIDE SERPL-SCNC: 100 MMOL/L (ref 98–107)
CHLORIDE SERPL-SCNC: 98 MMOL/L (ref 98–107)
CO2 SERPL-SCNC: 21.2 MMOL/L (ref 22–29)
CO2 SERPL-SCNC: 21.3 MMOL/L (ref 22–29)
CREAT BLD-MCNC: 0.53 MG/DL (ref 0.57–1)
CREAT BLD-MCNC: 0.66 MG/DL (ref 0.57–1)
DEPRECATED RDW RBC AUTO: 48.1 FL (ref 37–54)
EOSINOPHIL # BLD AUTO: 0.13 10*3/MM3 (ref 0–0.7)
EOSINOPHIL NFR BLD AUTO: 0.9 % (ref 0.3–6.2)
ERYTHROCYTE [DISTWIDTH] IN BLOOD BY AUTOMATED COUNT: 15.1 % (ref 11.7–13)
GFR SERPL CREATININE-BSD FRML MDRD: 120 ML/MIN/1.73
GFR SERPL CREATININE-BSD FRML MDRD: 93 ML/MIN/1.73
GLOBULIN UR ELPH-MCNC: 3.5 GM/DL
GLUCOSE BLD-MCNC: 115 MG/DL (ref 65–99)
GLUCOSE BLD-MCNC: 157 MG/DL (ref 65–99)
HCT VFR BLD AUTO: 35 % (ref 35.6–45.5)
HGB BLD-MCNC: 11.8 G/DL (ref 11.9–15.5)
IMM GRANULOCYTES # BLD: 0.04 10*3/MM3 (ref 0–0.03)
IMM GRANULOCYTES NFR BLD: 0.3 % (ref 0–0.5)
LYMPHOCYTES # BLD AUTO: 1.93 10*3/MM3 (ref 0.9–4.8)
LYMPHOCYTES NFR BLD AUTO: 12.9 % (ref 19.6–45.3)
MCH RBC QN AUTO: 30.3 PG (ref 26.9–32)
MCHC RBC AUTO-ENTMCNC: 33.7 G/DL (ref 32.4–36.3)
MCV RBC AUTO: 89.7 FL (ref 80.5–98.2)
MONOCYTES # BLD AUTO: 1.24 10*3/MM3 (ref 0.2–1.2)
MONOCYTES NFR BLD AUTO: 8.3 % (ref 5–12)
NEUTROPHILS # BLD AUTO: 11.58 10*3/MM3 (ref 1.9–8.1)
NEUTROPHILS NFR BLD AUTO: 77.4 % (ref 42.7–76)
PLATELET # BLD AUTO: 282 10*3/MM3 (ref 140–500)
PMV BLD AUTO: 10 FL (ref 6–12)
POTASSIUM BLD-SCNC: 2.8 MMOL/L (ref 3.5–5.2)
POTASSIUM BLD-SCNC: 3.2 MMOL/L (ref 3.5–5.2)
PROT SERPL-MCNC: 7.5 G/DL (ref 6–8.5)
RBC # BLD AUTO: 3.9 10*6/MM3 (ref 3.9–5.2)
SODIUM BLD-SCNC: 138 MMOL/L (ref 136–145)
SODIUM BLD-SCNC: 139 MMOL/L (ref 136–145)
WBC NRBC COR # BLD: 14.95 10*3/MM3 (ref 4.5–10.7)

## 2017-05-08 PROCEDURE — 74177 CT ABD & PELVIS W/CONTRAST: CPT

## 2017-05-08 PROCEDURE — 80048 BASIC METABOLIC PNL TOTAL CA: CPT | Performed by: INTERNAL MEDICINE

## 2017-05-08 PROCEDURE — 25010000002 MORPHINE PER 10 MG: Performed by: INTERNAL MEDICINE

## 2017-05-08 PROCEDURE — 96376 TX/PRO/DX INJ SAME DRUG ADON: CPT

## 2017-05-08 PROCEDURE — 0 IOPAMIDOL 61 % SOLUTION: Performed by: EMERGENCY MEDICINE

## 2017-05-08 PROCEDURE — 25010000002 HYDROMORPHONE PER 4 MG

## 2017-05-08 PROCEDURE — G0378 HOSPITAL OBSERVATION PER HR: HCPCS

## 2017-05-08 PROCEDURE — 85025 COMPLETE CBC W/AUTO DIFF WBC: CPT | Performed by: INTERNAL MEDICINE

## 2017-05-08 PROCEDURE — 96375 TX/PRO/DX INJ NEW DRUG ADDON: CPT

## 2017-05-08 PROCEDURE — 25010000002 ONDANSETRON PER 1 MG

## 2017-05-08 PROCEDURE — 99236 HOSP IP/OBS SAME DATE HI 85: CPT | Performed by: INTERNAL MEDICINE

## 2017-05-08 RX ORDER — ONDANSETRON 2 MG/ML
4 INJECTION INTRAMUSCULAR; INTRAVENOUS EVERY 4 HOURS PRN
Status: DISCONTINUED | OUTPATIENT
Start: 2017-05-08 | End: 2017-05-08 | Stop reason: HOSPADM

## 2017-05-08 RX ORDER — OXYCODONE HYDROCHLORIDE AND ACETAMINOPHEN 5; 325 MG/1; MG/1
1 TABLET ORAL EVERY 4 HOURS PRN
Status: DISCONTINUED | OUTPATIENT
Start: 2017-05-08 | End: 2017-05-08 | Stop reason: HOSPADM

## 2017-05-08 RX ORDER — MORPHINE SULFATE 2 MG/ML
2 INJECTION, SOLUTION INTRAMUSCULAR; INTRAVENOUS EVERY 4 HOURS PRN
Status: DISCONTINUED | OUTPATIENT
Start: 2017-05-08 | End: 2017-05-08 | Stop reason: HOSPADM

## 2017-05-08 RX ORDER — SODIUM CHLORIDE AND POTASSIUM CHLORIDE 150; 900 MG/100ML; MG/100ML
75 INJECTION, SOLUTION INTRAVENOUS CONTINUOUS
Status: DISCONTINUED | OUTPATIENT
Start: 2017-05-08 | End: 2017-05-08 | Stop reason: HOSPADM

## 2017-05-08 RX ORDER — FLUOXETINE HYDROCHLORIDE 20 MG/1
40 CAPSULE ORAL DAILY
Status: DISCONTINUED | OUTPATIENT
Start: 2017-05-08 | End: 2017-05-08 | Stop reason: HOSPADM

## 2017-05-08 RX ORDER — HYDROCODONE BITARTRATE AND ACETAMINOPHEN 5; 325 MG/1; MG/1
2 TABLET ORAL EVERY 4 HOURS PRN
Status: DISCONTINUED | OUTPATIENT
Start: 2017-05-08 | End: 2017-05-08

## 2017-05-08 RX ORDER — OXYCODONE HYDROCHLORIDE AND ACETAMINOPHEN 5; 325 MG/1; MG/1
2 TABLET ORAL EVERY 4 HOURS PRN
Status: DISCONTINUED | OUTPATIENT
Start: 2017-05-08 | End: 2017-05-08 | Stop reason: HOSPADM

## 2017-05-08 RX ORDER — POTASSIUM CHLORIDE 750 MG/1
40 CAPSULE, EXTENDED RELEASE ORAL ONCE
Status: COMPLETED | OUTPATIENT
Start: 2017-05-08 | End: 2017-05-08

## 2017-05-08 RX ORDER — ONDANSETRON 2 MG/ML
4 INJECTION INTRAMUSCULAR; INTRAVENOUS ONCE
Status: COMPLETED | OUTPATIENT
Start: 2017-05-08 | End: 2017-05-08

## 2017-05-08 RX ORDER — PANTOPRAZOLE SODIUM 40 MG/1
40 TABLET, DELAYED RELEASE ORAL
Status: DISCONTINUED | OUTPATIENT
Start: 2017-05-09 | End: 2017-05-08 | Stop reason: HOSPADM

## 2017-05-08 RX ORDER — ALBUTEROL SULFATE 2.5 MG/3ML
2.5 SOLUTION RESPIRATORY (INHALATION) EVERY 6 HOURS PRN
Status: DISCONTINUED | OUTPATIENT
Start: 2017-05-08 | End: 2017-05-08 | Stop reason: HOSPADM

## 2017-05-08 RX ORDER — HYDROCODONE BITARTRATE AND ACETAMINOPHEN 5; 325 MG/1; MG/1
1 TABLET ORAL EVERY 4 HOURS PRN
Status: DISCONTINUED | OUTPATIENT
Start: 2017-05-08 | End: 2017-05-08

## 2017-05-08 RX ORDER — ONDANSETRON 2 MG/ML
INJECTION INTRAMUSCULAR; INTRAVENOUS
Status: COMPLETED
Start: 2017-05-08 | End: 2017-05-08

## 2017-05-08 RX ADMIN — ONDANSETRON 4 MG: 2 INJECTION INTRAMUSCULAR; INTRAVENOUS at 03:59

## 2017-05-08 RX ADMIN — Medication 1 MG: at 03:59

## 2017-05-08 RX ADMIN — OXYCODONE HYDROCHLORIDE AND ACETAMINOPHEN 2 TABLET: 5; 325 TABLET ORAL at 10:37

## 2017-05-08 RX ADMIN — MORPHINE SULFATE 2 MG: 2 INJECTION, SOLUTION INTRAMUSCULAR; INTRAVENOUS at 07:26

## 2017-05-08 RX ADMIN — OXYCODONE HYDROCHLORIDE AND ACETAMINOPHEN 2 TABLET: 5; 325 TABLET ORAL at 14:24

## 2017-05-08 RX ADMIN — HYDROMORPHONE HYDROCHLORIDE 1 MG: 1 INJECTION, SOLUTION INTRAMUSCULAR; INTRAVENOUS; SUBCUTANEOUS at 03:59

## 2017-05-08 RX ADMIN — POTASSIUM CHLORIDE 40 MEQ: 750 CAPSULE, EXTENDED RELEASE ORAL at 03:31

## 2017-05-08 RX ADMIN — HYDROCODONE BITARTRATE AND ACETAMINOPHEN 2 TABLET: 5; 325 TABLET ORAL at 05:09

## 2017-05-08 RX ADMIN — IOPAMIDOL 85 ML: 612 INJECTION, SOLUTION INTRAVENOUS at 01:25

## 2017-05-08 RX ADMIN — FLUOXETINE HYDROCHLORIDE 40 MG: 20 CAPSULE ORAL at 08:01

## 2017-05-12 ENCOUNTER — OFFICE VISIT (OUTPATIENT)
Dept: CARDIOLOGY | Facility: CLINIC | Age: 57
End: 2017-05-12

## 2017-05-12 ENCOUNTER — TELEPHONE (OUTPATIENT)
Dept: CARDIOLOGY | Facility: CLINIC | Age: 57
End: 2017-05-12

## 2017-05-12 VITALS
BODY MASS INDEX: 17.11 KG/M2 | HEART RATE: 86 BPM | SYSTOLIC BLOOD PRESSURE: 126 MMHG | DIASTOLIC BLOOD PRESSURE: 86 MMHG | OXYGEN SATURATION: 99 % | HEIGHT: 67 IN | WEIGHT: 109 LBS

## 2017-05-12 DIAGNOSIS — R07.89 OTHER CHEST PAIN: ICD-10-CM

## 2017-05-12 DIAGNOSIS — K66.1 RETROPERITONEAL HEMATOMA: ICD-10-CM

## 2017-05-12 DIAGNOSIS — I73.9 PAD (PERIPHERAL ARTERY DISEASE) (HCC): Primary | ICD-10-CM

## 2017-05-12 PROCEDURE — 93000 ELECTROCARDIOGRAM COMPLETE: CPT | Performed by: PHYSICIAN ASSISTANT

## 2017-05-12 PROCEDURE — 99214 OFFICE O/P EST MOD 30 MIN: CPT | Performed by: PHYSICIAN ASSISTANT

## 2017-06-07 ENCOUNTER — OFFICE VISIT (OUTPATIENT)
Dept: CARDIOLOGY | Facility: CLINIC | Age: 57
End: 2017-06-07

## 2017-06-07 VITALS
HEART RATE: 88 BPM | HEIGHT: 67 IN | BODY MASS INDEX: 17.11 KG/M2 | DIASTOLIC BLOOD PRESSURE: 64 MMHG | SYSTOLIC BLOOD PRESSURE: 126 MMHG | WEIGHT: 109 LBS

## 2017-06-07 DIAGNOSIS — K66.1 RETROPERITONEAL HEMATOMA: ICD-10-CM

## 2017-06-07 DIAGNOSIS — I73.9 PAD (PERIPHERAL ARTERY DISEASE) (HCC): Primary | ICD-10-CM

## 2017-06-07 DIAGNOSIS — I74.9 CHOLESTEROL EMBOLIZATION SYNDROME (HCC): ICD-10-CM

## 2017-06-07 PROCEDURE — 99213 OFFICE O/P EST LOW 20 MIN: CPT | Performed by: INTERNAL MEDICINE

## 2017-06-15 NOTE — PROGRESS NOTES
Subjective:     Encounter Date:06/07/2017      Patient ID: Susy Ferreira is a 57 y.o. female.    Chief Complaint: PAD, retroperitoneal bleeding, cholesterol embolization    History of Present Illness     Dear Dr. Willoughby:    I had the pleasure of seeing the patient in cardiac followup today. As you well know, she is a jessie 57-year-old woman with history of severe peripheral arterial disease. She has had cholesterol embolization in the past.     She has had bilateral iliac artery stents with occlusion of her left iliac artery. She has had balloon angioplasty of the restenosis in the past which did not stay open.    Most recently she had restenting of her left iliac artery using a stent graft. The procedure was very successful angiographically. She says that her leg has felt better than it has in years.     Her femoral access was complicated by access site bleeding. She had a retroperitoneal bleed that required transfusion.     Since that time, she has had some chronic pain related to the retroperitoneal hematoma. Other than that, she has done very well. She is very pleased with the results of her most recent intervention.         Review of Systems   All other systems reviewed and are negative.      Procedures       Objective:     Physical Exam   Constitutional: She is oriented to person, place, and time. She appears well-developed and well-nourished.   HENT:   Head: Normocephalic and atraumatic.   Neck: Normal range of motion. Neck supple.   Cardiovascular: Normal rate, regular rhythm and normal heart sounds.    Pulmonary/Chest: Effort normal and breath sounds normal.   Abdominal: Soft. Bowel sounds are normal.   Musculoskeletal: Normal range of motion.   Neurological: She is alert and oriented to person, place, and time.   Skin: Skin is warm and dry.   Psychiatric: She has a normal mood and affect. Her behavior is normal. Thought content normal.   Vitals reviewed.      Lab Review:       Assessment:           Diagnosis Plan   1. PAD (peripheral artery disease)     2. Retroperitoneal hematoma     3. Cholesterol embolization syndrome            Plan:        It was a pleasure to see your patient Susy Ferreira in cardiac followup today.  She had successful stent grafting of her left iliac artery.  She is pleased with her leg perfusion which she says is the best it has been in years.  She did have a serious bleeding complication but did well with transfusion.  I reassured her that her flank pain will resolve with time.      She will remain on dual antiplatelet therapy.  She will see me again in six months or sooner if symptoms warrant.

## 2017-08-24 RX ORDER — CLOPIDOGREL BISULFATE 75 MG/1
TABLET ORAL
Qty: 30 TABLET | Refills: 5 | Status: SHIPPED | OUTPATIENT
Start: 2017-08-24 | End: 2018-02-24 | Stop reason: SDUPTHER

## 2017-12-13 ENCOUNTER — OFFICE (OUTPATIENT)
Dept: URBAN - METROPOLITAN AREA CLINIC 75 | Facility: CLINIC | Age: 57
End: 2017-12-13
Payer: MEDICAID

## 2017-12-13 VITALS
HEIGHT: 67 IN | HEART RATE: 68 BPM | SYSTOLIC BLOOD PRESSURE: 110 MMHG | DIASTOLIC BLOOD PRESSURE: 68 MMHG | WEIGHT: 115 LBS

## 2017-12-13 DIAGNOSIS — Z12.11 ENCOUNTER FOR SCREENING FOR MALIGNANT NEOPLASM OF COLON: ICD-10-CM

## 2017-12-13 DIAGNOSIS — R14.0 ABDOMINAL DISTENSION (GASEOUS): ICD-10-CM

## 2017-12-13 DIAGNOSIS — B18.2 CHRONIC VIRAL HEPATITIS C: ICD-10-CM

## 2017-12-13 PROCEDURE — 99204 OFFICE O/P NEW MOD 45 MIN: CPT | Performed by: INTERNAL MEDICINE

## 2017-12-13 NOTE — SERVICENOTES
records reviewed.  White count 14.5, hemoglobin hematocrit 11.8 35.0 MCV normal.  Platelets 282.  CT scan from May showed hepatic cysts versus hemangiomas.  Bilirubin 2.1, hepatic panel otherwise negative.  Suspect Jeanerette or bilirubin elevation may be due to her history of hematomas.

## 2017-12-13 NOTE — SERVICEHPINOTES
Thank you very much for allowing me to evaluate Ms. Munoz. As you know she is a pleasant 57-year-old white female who does have a history of hepatitis C. She was treated with interferon over a decade ago. I treated her at that time and she was lost to followup. She was successfully treated at that time. She does tell me however that she was recently told she had hepatitis C, she may simply have a persistently positive antibody and be free of the virus. I told her we would need to update her blood work to be sure. Her CT scan does not show any evidence of cirrhosis, platelets are normal. Transaminases are normal. She has a slight elevation in bilirubin which could be due to Gilbert years. She also has a history of recurrent hematomas for some reason so that could cause an elevated bilirubin as well. She is not a drinker.She also complains of intermittent bloating, she's had symptoms off and on for 6 months. There is no obvious food trigger. She says the bloating comes and goes with no obvious rhyme or reason. There is no food trigger. It doesn't correlate with her bowels. She does have a history of COPD.She has no lower GI complaints. Her bowels are regular. There is no melena, there is no lower abdominal pain or weight loss. Family history is negative for polyps, colitis or colon cancer. She's never had a screening colonoscopy. She looks chronically ill but she is in no acute distress.

## 2017-12-21 ENCOUNTER — OFFICE VISIT (OUTPATIENT)
Dept: CARDIOLOGY | Facility: CLINIC | Age: 57
End: 2017-12-21

## 2017-12-21 VITALS
HEIGHT: 67 IN | DIASTOLIC BLOOD PRESSURE: 60 MMHG | BODY MASS INDEX: 17.11 KG/M2 | WEIGHT: 109 LBS | HEART RATE: 86 BPM | SYSTOLIC BLOOD PRESSURE: 106 MMHG

## 2017-12-21 DIAGNOSIS — I73.9 PAD (PERIPHERAL ARTERY DISEASE) (HCC): Primary | ICD-10-CM

## 2017-12-21 PROCEDURE — 99213 OFFICE O/P EST LOW 20 MIN: CPT | Performed by: INTERNAL MEDICINE

## 2017-12-28 ENCOUNTER — HOSPITAL ENCOUNTER (OUTPATIENT)
Dept: CARDIOLOGY | Facility: HOSPITAL | Age: 57
Discharge: HOME | End: 2017-12-28
Attending: INTERNAL MEDICINE | Admitting: INTERNAL MEDICINE

## 2017-12-28 DIAGNOSIS — I73.9 PAD (PERIPHERAL ARTERY DISEASE) (HCC): ICD-10-CM

## 2017-12-28 LAB
BH CV LOWER ARTERIAL LEFT ABI RATIO: 0.61
BH CV LOWER ARTERIAL LEFT HIGH THIGH SYS MAX: 73 MMHG
BH CV LOWER ARTERIAL LEFT LOW THIGH SYS MAX: 76 MMHG
BH CV LOWER ARTERIAL LEFT POPLITEAL SYS MAX: 73 MMHG
BH CV LOWER ARTERIAL LEFT POST TIBIAL SYS MAX: 70 MMHG
BH CV LOWER ARTERIAL RIGHT ABI RATIO: 0.92
BH CV LOWER ARTERIAL RIGHT HIGH THIGH SYS MAX: 103 MMHG
BH CV LOWER ARTERIAL RIGHT LOW THIGH SYS MAX: 113 MMHG
BH CV LOWER ARTERIAL RIGHT POPLITEAL SYS MAX: 104 MMHG
BH CV LOWER ARTERIAL RIGHT POST TIBIAL SYS MAX: 106 MMHG
UPPER ARTERIAL LEFT ARM BRACHIAL SYS MAX: 115 MMHG
UPPER ARTERIAL RIGHT ARM BRACHIAL SYS MAX: 102 MMHG

## 2017-12-28 PROCEDURE — 93923 UPR/LXTR ART STDY 3+ LVLS: CPT | Performed by: INTERNAL MEDICINE

## 2017-12-28 PROCEDURE — 93923 UPR/LXTR ART STDY 3+ LVLS: CPT

## 2017-12-28 NOTE — PROGRESS NOTES
Subjective:     Encounter Date:12/21/2017      Patient ID: Susy Ferreira is a 57 y.o. female.    Chief Complaint: PAD    History of Present Illness    Dear Dr. Barcenas,    I had the pleasure of seeing this patient in cardiac follow up today.  As you well know, she is a jessie 57-year-old woman with history of severe peripheral arterial disease.  She has had cholesterol embolization in the past.  She has had bilateral iliac artery stents with occlusion of her left iliac artery.  She ended up having a stent graft placed in the left iliac artery with significant improvement in her walking.  This was complicated by retroperitoneal bleeding.    She states that her walking was good for a few years, but unfortunately her symptoms of claudication have returned.  She continues to smoke.        Review of Systems   All other systems reviewed and are negative.      Procedures       Objective:     Physical Exam   Constitutional: She is oriented to person, place, and time. She appears well-developed and well-nourished.   HENT:   Head: Normocephalic and atraumatic.   Neck: Normal range of motion. Neck supple.   Cardiovascular: Normal rate, regular rhythm and normal heart sounds.    Reduced pulse in L groin   Pulmonary/Chest: Effort normal and breath sounds normal.   Abdominal: Soft. Bowel sounds are normal.   Musculoskeletal: Normal range of motion.   Neurological: She is alert and oriented to person, place, and time.   Skin: Skin is warm and dry.   Psychiatric: She has a normal mood and affect. Her behavior is normal. Thought content normal.   Vitals reviewed.      Lab Review:       Assessment:          Diagnosis Plan   1. PAD (peripheral artery disease)  Doppler Arterial Multi Level Lower Extremity - Bilateral CAR          Plan:       It was a pleasure to see your patient in vascular follow up today.  She has had a very difficult time with continued peripheral arterial disease.  She has had multiple interventions in her  left leg.  Her most recent intervention lasted the longest, but unfortunately she has likely restenosed this area.  I have talked to her about other options for treatment.  I think she does not have anymore percutaneous options.  She is looking at large bypass surgery if she wants to walk better.  She is reluctant to consider this at this time.  I will start with ankle brachial indices to document the severity of her claudication.  She will see me again in 6 months.

## 2018-02-26 RX ORDER — CLOPIDOGREL BISULFATE 75 MG/1
TABLET ORAL
Qty: 30 TABLET | Refills: 5 | Status: SHIPPED | OUTPATIENT
Start: 2018-02-26 | End: 2018-08-20 | Stop reason: SDUPTHER

## 2018-08-20 RX ORDER — CLOPIDOGREL BISULFATE 75 MG/1
TABLET ORAL
Qty: 90 TABLET | Refills: 2 | Status: SHIPPED | OUTPATIENT
Start: 2018-08-20

## 2019-02-13 ENCOUNTER — TRANSCRIBE ORDERS (OUTPATIENT)
Dept: SLEEP MEDICINE | Facility: HOSPITAL | Age: 59
End: 2019-02-13

## 2019-02-13 ENCOUNTER — APPOINTMENT (OUTPATIENT)
Dept: SLEEP MEDICINE | Facility: HOSPITAL | Age: 59
End: 2019-02-13

## 2019-02-13 DIAGNOSIS — R29.818 SUSPECTED SLEEP APNEA: ICD-10-CM

## 2019-02-13 DIAGNOSIS — G47.10 HYPERSOMNIA: Primary | ICD-10-CM

## 2019-02-18 ENCOUNTER — HOSPITAL ENCOUNTER (OUTPATIENT)
Dept: SLEEP MEDICINE | Facility: HOSPITAL | Age: 59
Discharge: HOME | End: 2019-02-18
Admitting: INTERNAL MEDICINE

## 2019-02-18 DIAGNOSIS — G47.10 HYPERSOMNIA: ICD-10-CM

## 2019-02-18 DIAGNOSIS — R29.818 SUSPECTED SLEEP APNEA: ICD-10-CM

## 2019-02-18 PROCEDURE — 95810 POLYSOM 6/> YRS 4/> PARAM: CPT

## 2019-02-26 ENCOUNTER — TELEPHONE (OUTPATIENT)
Dept: SLEEP MEDICINE | Facility: HOSPITAL | Age: 59
End: 2019-02-26

## 2019-02-26 NOTE — TELEPHONE ENCOUNTER
Jt called pt to notify that SS results are ready and per Dr. Canales's notes, pt is to schedule follow up consult at [facility] to discuss results. No set up orders in chart.  Pt to call [facility] to schedule follow up. homer

## 2021-01-19 ENCOUNTER — HOSPITAL ENCOUNTER (EMERGENCY)
Facility: HOSPITAL | Age: 61
Discharge: HOME | End: 2021-01-19
Attending: EMERGENCY MEDICINE | Admitting: EMERGENCY MEDICINE

## 2021-01-19 ENCOUNTER — APPOINTMENT (OUTPATIENT)
Dept: GENERAL RADIOLOGY | Facility: HOSPITAL | Age: 61
End: 2021-01-19

## 2021-01-19 VITALS
SYSTOLIC BLOOD PRESSURE: 113 MMHG | OXYGEN SATURATION: 96 % | RESPIRATION RATE: 18 BRPM | DIASTOLIC BLOOD PRESSURE: 72 MMHG | HEART RATE: 61 BPM | TEMPERATURE: 97.3 F

## 2021-01-19 DIAGNOSIS — Z87.09 HISTORY OF COPD: ICD-10-CM

## 2021-01-19 DIAGNOSIS — U07.1 COVID-19 VIRUS INFECTION: Primary | ICD-10-CM

## 2021-01-19 LAB
ALBUMIN SERPL-MCNC: 3.4 G/DL (ref 3.5–5.2)
ALBUMIN/GLOB SERPL: 0.9 G/DL
ALP SERPL-CCNC: 55 U/L (ref 39–117)
ALT SERPL W P-5'-P-CCNC: 11 U/L (ref 1–33)
ANION GAP SERPL CALCULATED.3IONS-SCNC: 12.9 MMOL/L (ref 5–15)
AST SERPL-CCNC: 15 U/L (ref 1–32)
BASOPHILS # BLD AUTO: 0.03 10*3/MM3 (ref 0–0.2)
BASOPHILS NFR BLD AUTO: 0.6 % (ref 0–1.5)
BILIRUB SERPL-MCNC: 0.6 MG/DL (ref 0–1.2)
BUN SERPL-MCNC: 13 MG/DL (ref 6–20)
BUN/CREAT SERPL: 22.8 (ref 7–25)
CALCIUM SPEC-SCNC: 8.8 MG/DL (ref 8.6–10.5)
CHLORIDE SERPL-SCNC: 101 MMOL/L (ref 98–107)
CO2 SERPL-SCNC: 22.1 MMOL/L (ref 22–29)
CREAT SERPL-MCNC: 0.57 MG/DL (ref 0.57–1)
DEPRECATED RDW RBC AUTO: 41.2 FL (ref 37–54)
EOSINOPHIL # BLD AUTO: 0.27 10*3/MM3 (ref 0–0.4)
EOSINOPHIL NFR BLD AUTO: 5.2 % (ref 0.3–6.2)
ERYTHROCYTE [DISTWIDTH] IN BLOOD BY AUTOMATED COUNT: 13.4 % (ref 12.3–15.4)
GFR SERPL CREATININE-BSD FRML MDRD: 109 ML/MIN/1.73
GLOBULIN UR ELPH-MCNC: 3.6 GM/DL
GLUCOSE SERPL-MCNC: 92 MG/DL (ref 65–99)
HCT VFR BLD AUTO: 38 % (ref 34–46.6)
HGB BLD-MCNC: 12.4 G/DL (ref 12–15.9)
HOLD SPECIMEN: NORMAL
HOLD SPECIMEN: NORMAL
IMM GRANULOCYTES # BLD AUTO: 0.18 10*3/MM3 (ref 0–0.05)
IMM GRANULOCYTES NFR BLD AUTO: 3.4 % (ref 0–0.5)
LYMPHOCYTES # BLD AUTO: 1.16 10*3/MM3 (ref 0.7–3.1)
LYMPHOCYTES NFR BLD AUTO: 22.1 % (ref 19.6–45.3)
MCH RBC QN AUTO: 27.6 PG (ref 26.6–33)
MCHC RBC AUTO-ENTMCNC: 32.6 G/DL (ref 31.5–35.7)
MCV RBC AUTO: 84.4 FL (ref 79–97)
MONOCYTES # BLD AUTO: 0.52 10*3/MM3 (ref 0.1–0.9)
MONOCYTES NFR BLD AUTO: 9.9 % (ref 5–12)
NEUTROPHILS NFR BLD AUTO: 3.08 10*3/MM3 (ref 1.7–7)
NEUTROPHILS NFR BLD AUTO: 58.8 % (ref 42.7–76)
NRBC BLD AUTO-RTO: 0 /100 WBC (ref 0–0.2)
NT-PROBNP SERPL-MCNC: 86.5 PG/ML (ref 0–900)
PLATELET # BLD AUTO: 302 10*3/MM3 (ref 140–450)
PMV BLD AUTO: 10.1 FL (ref 6–12)
POTASSIUM SERPL-SCNC: 3.5 MMOL/L (ref 3.5–5.2)
PROT SERPL-MCNC: 7 G/DL (ref 6–8.5)
QT INTERVAL: 473 MS
RBC # BLD AUTO: 4.5 10*6/MM3 (ref 3.77–5.28)
SODIUM SERPL-SCNC: 136 MMOL/L (ref 136–145)
TROPONIN T SERPL-MCNC: <0.01 NG/ML (ref 0–0.03)
WBC # BLD AUTO: 5.24 10*3/MM3 (ref 3.4–10.8)
WHOLE BLOOD HOLD SPECIMEN: NORMAL
WHOLE BLOOD HOLD SPECIMEN: NORMAL

## 2021-01-19 PROCEDURE — 84484 ASSAY OF TROPONIN QUANT: CPT

## 2021-01-19 PROCEDURE — 36415 COLL VENOUS BLD VENIPUNCTURE: CPT

## 2021-01-19 PROCEDURE — 93005 ELECTROCARDIOGRAM TRACING: CPT | Performed by: EMERGENCY MEDICINE

## 2021-01-19 PROCEDURE — 99283 EMERGENCY DEPT VISIT LOW MDM: CPT

## 2021-01-19 PROCEDURE — 85025 COMPLETE CBC W/AUTO DIFF WBC: CPT

## 2021-01-19 PROCEDURE — 83880 ASSAY OF NATRIURETIC PEPTIDE: CPT

## 2021-01-19 PROCEDURE — 93005 ELECTROCARDIOGRAM TRACING: CPT

## 2021-01-19 PROCEDURE — 80053 COMPREHEN METABOLIC PANEL: CPT

## 2021-01-19 PROCEDURE — 71045 X-RAY EXAM CHEST 1 VIEW: CPT

## 2021-01-19 PROCEDURE — 93010 ELECTROCARDIOGRAM REPORT: CPT | Performed by: INTERNAL MEDICINE

## 2021-01-19 RX ORDER — SODIUM CHLORIDE 0.9 % (FLUSH) 0.9 %
10 SYRINGE (ML) INJECTION AS NEEDED
Status: DISCONTINUED | OUTPATIENT
Start: 2021-01-19 | End: 2021-01-19 | Stop reason: HOSPADM

## 2021-01-19 NOTE — ED NOTES
Pt presents to ED via EMS from home. Pt reports a COVID+ on 1/11/2021, and reports she believes its developed into pneumonia, pt reports she called her lung doctor who would not prescribe her the medication she requested so she came to the ER and states she has been wearing her CPAP all the time. Pt reports hx of COPD and lung problems. Pt is A&OX4, able to ambulate, and in a mask at this time. Pt was 93% room air.     Jzamine Gonzales, RN  01/19/21 0614

## 2021-01-20 NOTE — ED PROVIDER NOTES
EMERGENCY DEPARTMENT ENCOUNTER    Room Number:  MIKE/I  Date seen:  1/20/2021  PCP: Tea Reyes MD  Historian: Patient      HPI:  Chief Complaint: Short of air, Covid  A complete HPI/ROS/PMH/PSH/SH/FH are unobtainable due to: Nothing  Context: Susy Ferreira is a 61 y.o. female who presents to the ED c/o mild shortness of breath.  Patient reports that she started feeling bad about 12 days ago.  She tested positive for Covid 9 days ago.  She has a history of COPD and wears CPAP at night but she does not wear home oxygen.  She denies chest pain.  She denies significant dyspnea on exertion.  She reports that she has noted that her oxygen level falls a little bit when lying flat at night but this improves when she wears her CPAP.  She is concerned that she has developed Covid pneumonia.            PAST MEDICAL HISTORY  Active Ambulatory Problems     Diagnosis Date Noted   • Angiopathy, peripheral (CMS/[facility]) 10/21/2016   • Cholesterol embolization syndrome (CMS/[facility]) 10/21/2016   • PAD (peripheral artery disease) (CMS/[facility]) 05/02/2017   • Retroperitoneal hematoma 05/08/2017   • Claudication (CMS/[facility]) 05/12/2017     Resolved Ambulatory Problems     Diagnosis Date Noted   • No Resolved Ambulatory Problems     Past Medical History:   Diagnosis Date   • Arthritis    • Chest pain    • COPD (chronic obstructive pulmonary disease) (CMS/[facility])    • SHERMAN (dyspnea on exertion)    • Hepatitis C 2002   • Psoriasis    • Psoriatic arthritis (CMS/[facility])    • Tobacco abuse          PAST SURGICAL HISTORY  Past Surgical History:   Procedure Laterality Date   • CARDIAC CATHETERIZATION N/A 11/22/2016    Procedure: Peripheral angiography, lower extremity angiogram with possible intervention;  Surgeon: Ja Warren MD;  Location: [facility] CATH [facility]VASIVE LOC[facility];  Service:    • CARDIAC CATHETERIZATION N/A 5/2/2017    Procedure: Stent CHACE peripheral;  Surgeon: Ja Warren MD;  Location:  [facility] CATH INVASIVE LOCATION;  Service:    • GANGLION CYST  EXCISION     • ILIAC ARTERY STENT     • INTERVENTIONAL RADIOLOGY PROCEDURE N/A 5/2/2017    Procedure: Abdominal Aortagram with Runoff - PTA of occluded L iliac stent;  Surgeon: Ja Warren MD;  Location: [facility] INVASIVE LOC[facility];  Service:    • TONSILLECTOMY           FAMILY HISTORY  Family History   Problem Relation Age of Onset   • Heart disease Mother    • Heart attack Mother    • Heart disease Father    • Heart attack Father    • Heart disease Sister    • No Known Problems Maternal Grandmother    • Heart disease Maternal Grandfather    • Heart attack Maternal Grandfather    • Heart disease Paternal Grandmother    • Heart attack Paternal Grandmother    • Heart disease Paternal Grandfather    • Heart attack Paternal Grandfather          SOCIAL HISTORY  Social History     Socioeconomic History   • Marital status: Single     Spouse name: Not on file   • Number of children: Not on file   • Years of education: Not on file   • Highest education level: Not on file   Tobacco Use   • Smoking status: Current Every Day Smoker     Packs/day: 0.50   • Smokeless tobacco: Never Used   Substance and Sexual Activity   • Alcohol use: No   • Drug use: No   • Sexual activity: Defer         ALLERGIES  No known drug allergy        REVIEW OF SYSTEMS  Review of Systems   Review of all 14 systems is negative other than stated in the HPI above.      PHYSICAL EXAM  ED Triage Vitals [01/19/21 1354]   Temp Heart Rate Resp BP SpO2   97.3 °F (36.3 °C) 61 18 113/72 96 %      Temp src Heart Rate Source Patient Position BP Location FiO2 (%)   Tympanic Monitor Sitting Right arm --         GENERAL: Awake and alert, no acute distress  HENT: nares patent  EYES: no scleral icterus  CV: regular rhythm, normal rate  RESPIRATORY: normal effort, normal work of breathing, no wheezing bilaterally, slightly diminished breath sounds at lung bases bilaterally  ABDOMEN: soft, nondistended, nontender throughout  MUSCULOSKELETAL: no deformity  NEURO: alert,  moves all extremities, follows commands  PSYCH:  calm, cooperative  SKIN: warm, dry    Vital signs and nursing notes reviewed.          LAB RESULTS  Recent Results (from the past 24 hour(s))   Comprehensive Metabolic Panel    Collection Time: 01/19/21  3:02 PM    Specimen: Blood   Result Value Ref Range    Glucose 92 65 - 99 mg/dL    BUN 13 6 - 20 mg/dL    Creatinine 0.57 0.57 - 1.00 mg/dL    Sodium 136 136 - 145 mmol/L    Potassium 3.5 3.5 - 5.2 mmol/L    Chloride 101 98 - 107 mmol/L    CO2 22.1 22.0 - 29.0 mmol/L    Calcium 8.8 8.6 - 10.5 mg/dL    Total Protein 7.0 6.0 - 8.5 g/dL    Albumin 3.40 (L) 3.50 - 5.20 g/dL    ALT (SGPT) 11 1 - 33 U/L    AST (SGOT) 15 1 - 32 U/L    Alkaline Phosphatase 55 39 - 117 U/L    Total Bilirubin 0.6 0.0 - 1.2 mg/dL    eGFR Non African Amer 109 >60 mL/min/1.73    Globulin 3.6 gm/dL    A/G Ratio 0.9 g/dL    BUN/Creatinine Ratio 22.8 7.0 - 25.0    Anion Gap 12.9 5.0 - 15.0 mmol/L   BNP    Collection Time: 01/19/21  3:02 PM    Specimen: Blood   Result Value Ref Range    proBNP 86.5 0.0 - 900.0 pg/mL   Troponin    Collection Time: 01/19/21  3:02 PM    Specimen: Blood   Result Value Ref Range    Troponin T <0.010 0.000 - 0.030 ng/mL   Light Blue Top    Collection Time: 01/19/21  3:02 PM   Result Value Ref Range    Extra Tube hold for add-on    Green Top (Gel)    Collection Time: 01/19/21  3:02 PM   Result Value Ref Range    Extra Tube Hold for add-ons.    Lavender Top    Collection Time: 01/19/21  3:02 PM   Result Value Ref Range    Extra Tube hold for add-on    Gold Top - SST    Collection Time: 01/19/21  3:02 PM   Result Value Ref Range    Extra Tube Hold for add-ons.    CBC Auto Differential    Collection Time: 01/19/21  3:02 PM    Specimen: Blood   Result Value Ref Range    WBC 5.24 3.40 - 10.80 10*3/mm3    RBC 4.50 3.77 - 5.28 10*6/mm3    Hemoglobin 12.4 12.0 - 15.9 g/dL    Hematocrit 38.0 34.0 - 46.6 %    MCV 84.4 79.0 - 97.0 fL    MCH 27.6 26.6 - 33.0 pg    MCHC 32.6 31.5 - 35.7  g/dL    RDW 13.4 12.3 - 15.4 %    RDW-SD 41.2 37.0 - 54.0 fl    MPV 10.1 6.0 - 12.0 fL    Platelets 302 140 - 450 10*3/mm3    Neutrophil % 58.8 42.7 - 76.0 %    Lymphocyte % 22.1 19.6 - 45.3 %    Monocyte % 9.9 5.0 - 12.0 %    Eosinophil % 5.2 0.3 - 6.2 %    Basophil % 0.6 0.0 - 1.5 %    Immature Grans % 3.4 (H) 0.0 - 0.5 %    Neutrophils, Absolute 3.08 1.70 - 7.00 10*3/mm3    Lymphocytes, Absolute 1.16 0.70 - 3.10 10*3/mm3    Monocytes, Absolute 0.52 0.10 - 0.90 10*3/mm3    Eosinophils, Absolute 0.27 0.00 - 0.40 10*3/mm3    Basophils, Absolute 0.03 0.00 - 0.20 10*3/mm3    Immature Grans, Absolute 0.18 (H) 0.00 - 0.05 10*3/mm3    nRBC 0.0 0.0 - 0.2 /100 WBC   ECG 12 Lead    Collection Time: 01/19/21  4:50 PM   Result Value Ref Range    QT Interval 473 ms       Ordered the above labs and reviewed the results.        RADIOLOGY  Xr Chest 1 View    Result Date: 1/19/2021  ONE-VIEW PORTABLE CHEST  HISTORY: Positive COVID-19 infection. Cough and shortness of breath.  FINDINGS: The lungs are well-expanded with some minimal vague haziness in the periphery of the upper right lung and to a lesser extent at the right base and these findings are new since a study of 07/22/2012 and raising concern of minimal changes of COVID-19 pneumonia. The heart size is normal.  This report was finalized on 1/19/2021 7:22 PM by Dr. Nicholas Alonso M.D.        Ordered the above noted radiological studies. Reviewed by me in PACS.            PROCEDURES  Procedures              MEDICATIONS GIVEN IN ER  Medications - No data to display                MEDICAL DECISION MAKING, PROGRESS, and CONSULTS    All labs have been independently reviewed by me.  All radiology studies have been reviewed by me and discussed with radiologist dictating the report.   EKG's independently viewed and interpreted by me.  Discussion below represents my analysis of pertinent findings related to patient's condition, differential diagnosis, treatment plan and final  disposition.    ED Course as of Jan 20 0025   Tue Jan 19, 2021   1725 Patient is in no respiratory distress.  O2 saturations between 91 and 97%.  The 91% was when lying supine.  She does have a history of sleep apnea and wears CPAP at home.  When sitting and standing, O2 saturations varied between 94 to 97%.  I explained that she is actually day 12 of illness now.  Given her mild symptoms, I would not recommend steroids at this point.  She has a home pulse oximeter.  I recommended she continue to monitor her oxygen saturations at home and if they are consistently below 90% then she should return for repeat evaluation.    [JR]   1726 Troponin T: <0.010 [JR]   1726 Potassium: 3.5 [JR]   1726 Creatinine: 0.57 [JR]   1726 WBC: 5.24 [JR]   1726 Hematocrit: 38.0 [JR]   1726 Chest x-ray reviewed and she does have some vague peripheral opacity particular the right lung field.  These findings are likely consistent with mild Covid pneumonia.    [JR]      ED Course User Index  [JR] Shahriar Duncan MD              I wore a gown, surgical mask, gloves, and eye protection during this patient encounter.  Patient also wearing a surgical mask.  Hand hygeine performed before and after seeing the patient.    DIAGNOSIS  Final diagnoses:   COVID-19 virus infection   History of COPD         DISPOSITION  DISCHARGE    Patient discharged in stable condition.    Reviewed implications of results, diagnosis, meds, responsibility to follow up, warning signs and symptoms of possible worsening, potential complications and reasons to return to ER.    Patient/Family voiced understanding of above instructions.    Discussed plan for discharge, as there is no emergent indication for admission. Patient referred to primary care provider for BP management due to today's BP. Pt/family is agreeable and understands need for follow up and repeat testing.  Pt is aware that discharge does not mean that nothing is wrong but it indicates no emergency is  present that requires admission and they must continue care with follow-up as given below or physician of their choice.     FOLLOW-UP  Tea Reyes MD  [address] [place], [place] [id number] [phone]    Schedule an appointment as soon as possible for a visit            Medication List      No changes were made to your prescriptions during this visit.                   Latest Documented Vital Signs:  As of 00:25 EST  BP- 113/72 HR- 61 Temp- 97.3 °F (36.3 °C) (Tympanic) O2 sat- 96%        --    Please note that portions of this were completed with a voice recognition program.            Shahriar Duncan MD  01/20/21 0027

## 2021-03-03 ENCOUNTER — HOSPITAL ENCOUNTER (EMERGENCY)
Facility: HOSPITAL | Age: 61
Discharge: HOME | End: 2021-03-03
Attending: EMERGENCY MEDICINE | Admitting: EMERGENCY MEDICINE

## 2021-03-03 ENCOUNTER — APPOINTMENT (OUTPATIENT)
Dept: GENERAL RADIOLOGY | Facility: HOSPITAL | Age: 61
End: 2021-03-03

## 2021-03-03 VITALS
BODY MASS INDEX: 24.64 KG/M2 | WEIGHT: 157 LBS | HEART RATE: 62 BPM | HEIGHT: 67 IN | DIASTOLIC BLOOD PRESSURE: 78 MMHG | RESPIRATION RATE: 20 BRPM | SYSTOLIC BLOOD PRESSURE: 110 MMHG | TEMPERATURE: 96 F | OXYGEN SATURATION: 95 %

## 2021-03-03 DIAGNOSIS — J20.9 ACUTE BRONCHITIS, UNSPECIFIED ORGANISM: Primary | ICD-10-CM

## 2021-03-03 PROCEDURE — 71045 X-RAY EXAM CHEST 1 VIEW: CPT

## 2021-03-03 PROCEDURE — 99283 EMERGENCY DEPT VISIT LOW MDM: CPT

## 2021-03-03 RX ORDER — DOXYCYCLINE 100 MG/1
100 CAPSULE ORAL 2 TIMES DAILY
Qty: 20 CAPSULE | Refills: 0 | Status: SHIPPED | OUTPATIENT
Start: 2021-03-03

## 2021-03-03 NOTE — ED NOTES
Patient was placed in face mask in first look.  Patient was wearing a face mask throughout our encounter.  I wore protective eye protection throughout the encounter.  Hand hygiene was performed before and after patient encounter.     Patient sent from Urgent [facility] Center and Diagnosed with Pneumonia.  THe Urgent Care would not prescribe Antibiotic, but instead told to come to the ER.     Lucas Staton RN  03/03/21 1800

## 2021-03-04 NOTE — ED PROVIDER NOTES
" EMERGENCY DEPARTMENT ENCOUNTER    Room Number:  27/27  Date of encounter:  3/3/2021  PCP: Shankar Chowdhury MD  Historian: Patient      HPI:  Chief Complaint: \"I have got pneumonia again\"  A complete HPI/ROS/PMH/PSH/SH/FH are unobtainable due to: N/A    Context: Susy Ferreira is a 61 y.o. female who presents to the ED c/o cough and lower back pain for the past 3 to 4 days.  She states she is coughing up tannish sputum.  She is slightly more dyspneic.  She denies fevers or chills.  No chest pain.  No nausea, vomiting or diarrhea.  Of note, she had COVID-19 back in January and she is like she recovered fairly well from this.  He is a former smoker and has COPD.  She only takes Breo-she did not take albuterol because it makes her heart race.  She was seen at an urgent care earlier today and diagnosed with pneumonia (films are unavailable-she was at a non-[facility] facility).  She states that no medications were prescribed-she was advised to come to the emergency department for further evaluation.      The patient was placed in a mask in triage, hand hygiene was performed before and after my interaction with the patient.  I wore a mask, safety glasses and gloves during my entire interaction with the patient.    PAST MEDICAL HISTORY  Active Ambulatory Problems     Diagnosis Date Noted   • Angiopathy, peripheral (CMS/[facility]) 10/21/2016   • Cholesterol embolization syndrome (CMS/[facility]) 10/21/2016   • PAD (peripheral artery disease) (CMS/[facility]) 05/02/2017   • Retroperitoneal hematoma 05/08/2017   • Claudication (CMS/[facility]) 05/12/2017     Resolved Ambulatory Problems     Diagnosis Date Noted   • No Resolved Ambulatory Problems     Past Medical History:   Diagnosis Date   • Arthritis    • Chest pain    • COPD (chronic obstructive pulmonary disease) (CMS/[facility])    • SHERMAN (dyspnea on exertion)    • Hepatitis C 2002   • Psoriasis    • Psoriatic arthritis (CMS/[facility])    • Tobacco abuse          PAST SURGICAL HISTORY  Past Surgical " History:   Procedure Laterality Date   • CARDIAC CATHETERIZATION N/A 11/22/2016    Procedure: Peripheral angiography, lower extremity angiogram with possible intervention;  Surgeon: Ja Warren MD;  Location: [facility] INVASIVE LOCATION;  Service:    • CARDIAC CATHETERIZATION N/A 5/2/2017    Procedure: Stent CHACE peripheral;  Surgeon: Ja Warren MD;  Location: [facility] INVASIVE LOCATION;  Service:    • GANGLION CYST EXCISION     • ILIAC ARTERY STENT     • INTERVENTIONAL RADIOLOGY PROCEDURE N/A 5/2/2017    Procedure: Abdominal Aortagram with Runoff - PTA of occluded L iliac stent;  Surgeon: Ja Warren MD;  Location: [facility] INVASIVE LOCATION;  Service:    • TONSILLECTOMY           FAMILY HISTORY  Family History   Problem Relation Age of Onset   • Heart disease Mother    • Heart attack Mother    • Heart disease Father    • Heart attack Father    • Heart disease Sister    • No Known Problems Maternal Grandmother    • Heart disease Maternal Grandfather    • Heart attack Maternal Grandfather    • Heart disease Paternal Grandmother    • Heart attack Paternal Grandmother    • Heart disease Paternal Grandfather    • Heart attack Paternal Grandfather          SOCIAL HISTORY  Social History     Socioeconomic History   • Marital status: Single     Spouse name: Not on file   • Number of children: Not on file   • Years of education: Not on file   • Highest education level: Not on file   Tobacco Use   • Smoking status: Current Every Day Smoker     Packs/day: 0.50   • Smokeless tobacco: Never Used   Substance and Sexual Activity   • Alcohol use: No   • Drug use: No   • Sexual activity: Defer         ALLERGIES  No known drug allergy        REVIEW OF SYSTEMS  Review of Systems   Constitutional: Negative for chills and fever.   Respiratory: Positive for cough and shortness of breath.    Cardiovascular: Negative for chest pain.   Gastrointestinal: Negative for abdominal pain, diarrhea, nausea and vomiting.   Genitourinary:  Negative for difficulty urinating and dysuria.   Musculoskeletal: Positive for myalgias.        All systems reviewed and negative except for those discussed in HPI.       PHYSICAL EXAM    I have reviewed the triage vital signs and nursing notes.    ED Triage Vitals   Temp Heart Rate Resp BP SpO2   03/03/21 1759 03/03/21 1759 03/03/21 1759 03/03/21 1848 03/03/21 1759   96 °F (35.6 °C) 99 16 112/80 97 %      Temp src Heart Rate Source Patient Position BP Location FiO2 (%)   03/03/21 1759 03/03/21 1759 03/03/21 1848 03/03/21 1848 --   Tympanic Monitor Sitting Right arm        Physical Exam   Constitutional: Pt. is oriented to person, place, and time and well-developed, well-nourished, and in no distress.    HENT: Normocephalic and atraumatic.  Neck: Normal range of motion. Neck supple. No JVD present.   Cardiovascular: Normal rate, regular rhythm and normal heart sounds. Exam reveals no gallop and no friction rub.   No murmur heard.  Pulmonary/Chest: Effort normal and breath sounds normal. No stridor. No respiratory distress. No wheezes, no rales.   Abdominal: Soft. No distension. There is no tenderness. There is no rebound and no guarding.   Musculoskeletal: Normal range of motion. No edema, tenderness or deformity.   Neurological: Pt. is alert and oriented to person, place, and time.  She has no focal neurologic deficits.   Skin: Skin is warm and dry. No rash noted. Pt. is not diaphoretic. No erythema.   Psychiatric: Mood, affect and judgment normal.   Nursing note and vitals reviewed.        LAB RESULTS  No results found for this or any previous visit (from the past 24 hour(s)).    Ordered the above labs and independently reviewed the results.        RADIOLOGY  Xr Chest 1 View    Result Date: 3/3/2021  CHEST, ONE VIEW  HISTORY: Shortness of breath and cough  COMPARISON: 01/19/2021  FINDINGS: Lungs are well expanded. Stable minimal scarring/atelectasis in the lung bases. Heart size stable. No pneumothorax. No acute  bony abnormality.      No acute findings  This report was finalized on 3/3/2021 7:43 PM by Dr. Kade Avery M.D.        I ordered the above noted radiological studies. Reviewed by me and discussed with radiologist.  See dictation for official radiology interpretation.      PROCEDURES    Procedures      MEDICATIONS GIVEN IN ER    Medications - No data to display      PROGRESS, DATA ANALYSIS, CONSULTS, AND MEDICAL DECISION MAKING    Any/all labs have been independently reviewed by me.  Any/all radiology studies have been reviewed by me and discussed with radiologist dictating the report.   EKG's independently viewed and interpreted by me.  Discussion below represents my analysis of pertinent findings related to patient's condition, differential diagnosis, treatment plan and final disposition.      ED Course as of Mar 03 1949   Wed Mar 03, 2021   1932 She is not interested in an extensive work-up.  Chest x-ray is negative for acute disease.  Given her prior history of COPD and frequent pneumonia, doxycycline will be prescribed.    [WC]      ED Course User Index  [WC] Kaleb Espinosa MD       AS OF 19:49 EST VITALS:    BP - 112/80  HR - 62  TEMP - 96 °F (35.6 °C) (Tympanic)  02 SATS - 95%        DIAGNOSIS  Final diagnoses:   Acute bronchitis, unspecified organism         DISPOSITION  discharged           Kaleb Espinosa MD  03/03/21 1949

## 2021-03-24 ENCOUNTER — BULK ORDERING (OUTPATIENT)
Dept: CASE MANAGEMENT | Facility: OTHER | Age: 61
End: 2021-03-24

## 2021-03-24 DIAGNOSIS — Z23 IMMUNIZATION DUE: ICD-10-CM

## (undated) DEVICE — RADIFOCUS GLIDEWIRE: Brand: GLIDEWIRE

## (undated) DEVICE — INTRO SHEATH ART/FEM ENGAGE .035 6F12CM

## (undated) DEVICE — ARMADA 35 PTA CATHETER 7 MM X 60 MM X 80 CM / OVER-THE-WIRE: Brand: ARMADA

## (undated) DEVICE — Device

## (undated) DEVICE — GW EMR FIX EXCHG J STD .035 3MM 260CM

## (undated) DEVICE — CATH TENNIS RACQUET .035  5FR 65CM

## (undated) DEVICE — PK CATH CARD 40

## (undated) DEVICE — RADIFOCUS GLIDECATH: Brand: GLIDECATH

## (undated) DEVICE — HI-TORQUE SPARTACORE 14 PERIPHERAL GUIDE WIRE .014 5.0 CM X 190 CM: Brand: SPARTACORE

## (undated) DEVICE — HI-TORQUE SUPRA CORE .035 PERIPHERAL GUIDE WIRE .035 X 190 CM: Brand: HI-TORQUE SUPRA CORE

## (undated) DEVICE — INTRO SHEATH ART/FEM ENGAGE .035 7F12CM

## (undated) DEVICE — KT MANIFLD CARDIAC